# Patient Record
Sex: FEMALE | Race: WHITE | Employment: UNEMPLOYED | URBAN - METROPOLITAN AREA
[De-identification: names, ages, dates, MRNs, and addresses within clinical notes are randomized per-mention and may not be internally consistent; named-entity substitution may affect disease eponyms.]

---

## 2017-02-13 ENCOUNTER — OFFICE VISIT (OUTPATIENT)
Dept: FAMILY MEDICINE CLINIC | Age: 22
End: 2017-02-13

## 2017-02-13 VITALS
HEIGHT: 67 IN | TEMPERATURE: 98.5 F | HEART RATE: 67 BPM | BODY MASS INDEX: 20.25 KG/M2 | OXYGEN SATURATION: 96 % | SYSTOLIC BLOOD PRESSURE: 105 MMHG | RESPIRATION RATE: 20 BRPM | DIASTOLIC BLOOD PRESSURE: 64 MMHG | WEIGHT: 129 LBS

## 2017-02-13 DIAGNOSIS — M54.50 ACUTE BILATERAL LOW BACK PAIN WITHOUT SCIATICA: Primary | ICD-10-CM

## 2017-02-13 RX ORDER — CYCLOBENZAPRINE HCL 10 MG
10 TABLET ORAL
Qty: 30 TAB | Refills: 3 | Status: SHIPPED | OUTPATIENT
Start: 2017-02-13

## 2017-02-13 RX ORDER — DULOXETIN HYDROCHLORIDE 60 MG/1
60 CAPSULE, DELAYED RELEASE ORAL DAILY
COMMUNITY
End: 2017-11-16

## 2017-02-13 RX ORDER — NAPROXEN 500 MG/1
500 TABLET ORAL 2 TIMES DAILY WITH MEALS
Qty: 30 TAB | Refills: 3 | Status: SHIPPED | OUTPATIENT
Start: 2017-02-13 | End: 2017-02-27

## 2017-02-13 NOTE — PATIENT INSTRUCTIONS

## 2017-02-13 NOTE — PROGRESS NOTES
Chronic Illness Visit    Today's Date:  2017   Patient's Name: Nadine Gilmore   Patient's :  1995     History:     Chief Complaint   Patient presents with   [address]     pt here to establish care    Back Pain     pt c/o back pain n and pressure to right hip       Nadine Gilmore is a 25 y.o. female presenting for  Establishment of Care. Acute Back Pain    Onset: for the past few months she has had worsening lower back pain. She works out on a regular basis and does weight lifting. Denies history of trauma/injury  Character of Pain: achy  Denies problem with bowels or urine  Alleviating/Agravating Factors: worse after lifting  Current meds: none      Past Medical History   Diagnosis Date    Depression      JEREMY- Generalizes Anxiety Disorder     History reviewed. No pertinent past surgical history. Social History     Social History    Marital status:      Spouse name: N/A    Number of children: N/A    Years of education: N/A     Social History Main Topics    Smoking status: Never Smoker    Smokeless tobacco: None    Alcohol use Yes      Comment: occasional    Drug use: No    Sexual activity: Yes     Partners: Male     Birth control/ protection: Pill     Other Topics Concern    None     Social History Narrative    None     Family History   Problem Relation Age of Onset    Adopted: Yes     Not on File    Problem List:    There is no problem list on file for this patient. Medications:     Current Outpatient Prescriptions   Medication Sig    DULoxetine (CYMBALTA) 60 mg capsule Take 60 mg by mouth daily.  Lisdexamfetamine (VYVANSE) 40 mg capsule Take by mouth daily.  naproxen (NAPROSYN) 500 mg tablet Take 1 Tab by mouth two (2) times daily (with meals) for 14 days.  cyclobenzaprine (FLEXERIL) 10 mg tablet Take 1 Tab by mouth nightly. No current facility-administered medications for this visit.           Constitutional: negative for fevers, chills, sweats and fatigue  Respiratory: negative for cough, sputum or wheezing  Cardiovascular: negative for chest pain, chest pressure/discomfort, dyspnea  Gastrointestinal: negative for nausea, vomiting, diarrhea, constipation and abdominal pain  Musculoskeletal:positive for myalgias and back pain  Neurological: negative for headaches and dizziness  Behavioral/Psych: negative for anxiety and depression    Physical Assessment:   VS:    Visit Vitals    /64 (BP 1 Location: Left arm, BP Patient Position: Sitting)    Pulse 67    Temp 98.5 °F (36.9 °C) (Oral)    Resp 20    Ht 5' 6.5\" (1.689 m)    Wt 129 lb (58.5 kg)    LMP 01/22/2017    SpO2 96%    BMI 20.51 kg/m2       No results found for: NA, K, CL, CO2, AGAP, GLU, BUN, CREA, BUCR, GFRAA, GFRNA, CA, GFRAA    General:   Well-groomed, well-nourished, in no distress, pleasant, alert, appropriate and conversant. Mouth:  Good dentition, oropharynx WNL without membranes, exudates, petechiae or ulcers  Neck:   Neck supple, no swelling, mass or tenderness, no thyromegaly  Cardiovasc:   RRR, no MRG. Pulses 2+ and symmetric at distal extremities. Pulmonary:   Lungs clear bilaterally. Normal respiratory effort. Abdomen:   Abdomen soft, NT, ND, NAB. Extremities:   No edema, no TTP bilateral calves. LEs warm and well-perfused. Neuro:   Alert and oriented, CNs II-XII intact, no focal deficits. No facial asymmetry noted. Skin:    No rashes or jaundice  MSK:   Normal ROM, 5/5 muscle strength  Psych:  No pressured speech or abnormal thought content        Assessment/Plan & Orders:       1. Acute bilateral low back pain without sciatica        Orders Placed This Encounter    REFERRAL TO PHYSICAL THERAPY    DULoxetine (CYMBALTA) 60 mg capsule    Lisdexamfetamine (VYVANSE) 40 mg capsule    naproxen (NAPROSYN) 500 mg tablet    cyclobenzaprine (FLEXERIL) 10 mg tablet     Lower Back Pain   -recommend heat Tx, NSAIDS x 2 weeks and mild muscle relaxer.   Will also refer to PT. Hx of ADHD sees Psych if she brings documentation of diagnosis etc to next visit we will take over [facility] for patient. Records requested today sees Psych in [place]. Follow up in 2-3 months    *Plan of care reviewed with patient. Patient in agreement with plan and expresses understanding. All questions answered and patient encouraged to call or RTO if further questions or concerns.     Alvena Klinefelter, MD  Family Medicine  2/13/2017  12:35 PM

## 2017-02-15 PROBLEM — F90.2 ATTENTION DEFICIT HYPERACTIVITY DISORDER (ADHD), COMBINED TYPE: Chronic | Status: ACTIVE | Noted: 2017-02-15

## 2017-02-24 ENCOUNTER — HOSPITAL ENCOUNTER (OUTPATIENT)
Dept: PHYSICAL THERAPY | Age: 22
End: 2017-02-24

## 2017-03-06 ENCOUNTER — OFFICE VISIT (OUTPATIENT)
Dept: FAMILY MEDICINE CLINIC | Age: 22
End: 2017-03-06

## 2017-03-06 VITALS
HEIGHT: 66 IN | TEMPERATURE: 98 F | BODY MASS INDEX: 20.73 KG/M2 | DIASTOLIC BLOOD PRESSURE: 61 MMHG | SYSTOLIC BLOOD PRESSURE: 108 MMHG | RESPIRATION RATE: 18 BRPM | HEART RATE: 63 BPM | OXYGEN SATURATION: 97 % | WEIGHT: 129 LBS

## 2017-03-06 DIAGNOSIS — M54.50 ACUTE BILATERAL LOW BACK PAIN WITHOUT SCIATICA: ICD-10-CM

## 2017-03-06 DIAGNOSIS — F90.2 ATTENTION DEFICIT HYPERACTIVITY DISORDER (ADHD), COMBINED TYPE: Primary | Chronic | ICD-10-CM

## 2017-03-06 NOTE — PROGRESS NOTES
Chronic Illness Visit    Today's Date:  3/6/2017   Patient's Name: Anita Newton   Patient's :  1995     History:     Chief Complaint   Patient presents with    Medication Refill     Talita Santana is a 25 y.o. female presenting for Chronic Care. ADHD    Onset: diagnosed as a teenager  She reports issues paying attention for years  She is stable on current dose  Denies side effects such as weight loss or headaches  She was diagnosed by Psych Official note and eval have been scanned in    Past Medical History:   Diagnosis Date    Depression     JEREMY- Generalizes Anxiety Disorder     History reviewed. No pertinent surgical history. Social History     Social History    Marital status:      Spouse name: N/A    Number of children: N/A    Years of education: N/A     Social History Main Topics    Smoking status: Never Smoker    Smokeless tobacco: None    Alcohol use Yes      Comment: occasional    Drug use: No    Sexual activity: Yes     Partners: Male     Birth control/ protection: Pill     Other Topics Concern    None     Social History Narrative     Family History   Problem Relation Age of Onset    Adopted: Yes     No Known Allergies    Problem List:      Patient Active Problem List   Diagnosis Code    Attention deficit hyperactivity disorder (ADHD), combined type F90.2       Medications:     Current Outpatient Prescriptions   Medication Sig    [START ON 2017] Lisdexamfetamine (VYVANSE) 40 mg capsule Take 1 Cap (40 mg total) by mouth dailyEarliest Fill Date: 17. Max Daily Amount: 40 mg    DULoxetine (CYMBALTA) 60 mg capsule Take 60 mg by mouth daily.  cyclobenzaprine (FLEXERIL) 10 mg tablet Take 1 Tab by mouth nightly. No current facility-administered medications for this visit.           Constitutional: negative for fevers, chills, sweats and fatigue  Respiratory: negative for cough, sputum or wheezing  Cardiovascular: negative for chest pain, chest pressure/discomfort, dyspnea  Gastrointestinal: negative for nausea, vomiting, diarrhea, constipation and abdominal pain  Musculoskeletal:positive for myalgias and back pain  Neurological: negative for headaches and dizziness  Behavioral/Psych: negative for anxiety and depression    Physical Assessment:   VS:    Visit Vitals    /61 (BP 1 Location: Right arm, BP Patient Position: Sitting)    Pulse 63    Temp 98 °F (36.7 °C) (Oral)    Resp 18    Ht 5' 6\" (1.676 m)    Wt 129 lb (58.5 kg)    LMP 02/22/2017    SpO2 97%    BMI 20.82 kg/m2       No results found for: NA, K, CL, CO2, AGAP, GLU, BUN, CREA, BUCR, GFRAA, GFRNA, CA, GFRAA    General:   Well-groomed, well-nourished, in no distress, pleasant, alert, appropriate and conversant. Mouth:  Good dentition, oropharynx WNL without membranes, exudates, petechiae or ulcers  Neck:   Neck supple, no swelling, mass or tenderness, no thyromegaly  Cardiovasc:   RRR, no MRG. Pulses 2+ and symmetric at distal extremities. Pulmonary:   Lungs clear bilaterally. Normal respiratory effort. Abdomen:   Abdomen soft, NT, ND, NAB. Extremities:   No edema, no TTP bilateral calves. LEs warm and well-perfused. Neuro:   Alert and oriented, no focal deficits. No facial asymmetry noted. Skin:    No rashes or jaundice  MSK:   Normal ROM, 5/5 muscle strength  Psych:  No pressured speech or abnormal thought content        Assessment/Plan & Orders:       1. Attention deficit hyperactivity disorder (ADHD), combined type    2.  Acute bilateral low back pain without sciatica        Orders Placed This Encounter    DISCONTD: Lisdexamfetamine (VYVANSE) 40 mg capsule    DISCONTD: Lisdexamfetamine (VYVANSE) 40 mg capsule    Lisdexamfetamine (VYVANSE) 40 mg capsule     ADHD   -symptoms stable Vyvanse refilled (March/April/May)  - reviewed and controlled me contract signed today   reviewed no inappropriate meds/behavior observed  Acute Back Pain has appt with [facility] this week      Follow up in 2-3 months    *Plan of care reviewed with patient. Patient in agreement with plan and expresses understanding. All questions answered and patient encouraged to call or RTO if further questions or concerns.     Thelma Lyons MD  Family Medicine  3/6/2017  12:45 PM

## 2017-03-06 NOTE — MR AVS SNAPSHOT
Visit Information Date & Time Provider Department Dept. Phone Encounter #  
 3/6/2017 12:45 PM Miladis Jones [phone] [id number] Upcoming Health Maintenance Date Due Hepatitis A Peds Age 1-18 (1 of 2 - Standard Series) 10/10/1998 DTaP/Tdap/Td series (1 - Tdap) 10/10/2004 HPV AGE 9Y-26Y (1 of 3 - Female 3 Dose Series) 10/10/2008 INFLUENZA AGE 9 TO ADULT 8/1/2016 Allergies as of 3/6/2017  Review Complete On: 3/6/2017 By: Konrad Thomas, LPN No Known Allergies Current Immunizations  Never Reviewed No immunizations on file. Not reviewed this visit You Were Diagnosed With   
  
 Codes Comments Attention deficit hyperactivity disorder (ADHD), combined type    -  Primary ICD-10-CM: F90.2 ICD-9-CM: 314.01 Acute bilateral low back pain without sciatica     ICD-10-CM: M54.5 ICD-9-CM: 724.2, 338.19 Vitals BP Pulse Temp Resp Height(growth percentile) Weight(growth percentile) 108/61 (39 %/ 37 %)* (BP 1 Location: Right arm, BP Patient Position: Sitting) 63 98 °F (36.7 °C) (Oral) 18 5' 6\" (1.676 m) (75 %, Z= 0.67) 129 lb (58.5 kg) (53 %, Z= 0.08) LMP SpO2 BMI OB Status Smoking Status 02/22/2017 97% 20.82 kg/m2 (40 %, Z= -0.26) Having regular periods Never Smoker *BP percentiles are based on NHBPEP's 4th Report Growth percentiles are based on CDC 2-20 Years data. Vitals History BMI and BSA Data Body Mass Index Body Surface Area  
 20.82 kg/m 2 1.65 m 2 Preferred Pharmacy Pharmacy Name Phone [facility] [address] [phone] Your Updated Medication List  
  
   
This list is accurate as of: 3/6/17  1:11 PM.  Always use your most recent med list.  
  
  
  
  
 cyclobenzaprine 10 mg tablet Commonly known as:  FLEXERIL Take 1 Tab by mouth nightly. CYMBALTA 60 mg capsule Generic drug:  DULoxetine Take 60 mg by mouth daily. Lisdexamfetamine 40 mg capsule Commonly known as:  VYVANSE Take 1 Cap (40 mg total) by mouth dailyEarliest Fill Date: 5/6/17. Max Daily Amount: 40 mg  
Start taking on:  5/6/2017 Prescriptions Printed Refills Lisdexamfetamine (VYVANSE) 40 mg capsule 0 Starting on: 5/6/2017 Sig: Take 1 Cap (40 mg total) by mouth dailyEarliest Fill Date: 5/6/17. Max Daily Amount: 40 mg  
 Class: Print Route: Oral  
  
To-Do List   
 03/07/2017 3:00 PM  
  Appointment with Dimitris Tomlin PT at [facility] ([phone]) Patient Instructions Attention Deficit Hyperactivity Disorder (ADHD) in Children: Care Instructions Your Care Instructions Children with attention deficit hyperactivity disorder (ADHD) often have problems paying attention and focusing on tasks. They sometimes act without thinking. Some children also fidget or cannot sit still and have lots of energy. This common disorder can continue into adulthood. The exact cause of ADHD is not clear, although it seems to run in families. ADHD is not caused by eating too much sugar or by food additives, allergies, or immunizations. Medicines, counseling, and extra support at home and at school can help your child succeed. Your child's doctor will want to see your child regularly. Follow-up care is a key part of your child's treatment and safety. Be sure to make and go to all appointments, and call your doctor if your child is having problems. It's also a good idea to know your child's test results and keep a list of the medicines your child takes. How can you care for your child at home? Information · Learn about ADHD. This will help you and your family better understand how to help your child. · Ask your child's doctor or teacher about parenting classes and books. · Look for a support group for parents of children with ADHD. Medicines · Have your child take medicines exactly as prescribed. Call your doctor if you think your child is having a problem with his or her medicine. You will get more details on the specific medicines your doctor prescribes. · If your child misses a dose, do not give your child extra doses to catch up. · Keep close track of your child's medicines. Some medicines for ADHD can be abused by others. At home · Praise and reward your child for positive behavior. This should directly follow your child's positive behavior. · Give your child lots of attention and affection. Spend time with your child doing activities you both enjoy. · Step back and let your child learn cause and effect when possible. For example, let your child go without a coat when he or she resists taking one. Your child will learn that going out in cold weather without a coat is a poor decision. · Use time-outs or the loss of a privilege to discipline your child. · Try to keep a regular schedule for meals, naps, and bedtime. Some children with ADHD have a hard time with change. · Give instructions clearly. Break tasks into simple steps. Give one instruction at a time. · Try to be patient and calm around your child. Your child may act without thinking, so try not to get angry. · Tell your child exactly what you expect from him or her ahead of time. For example, when you plan to go grocery shopping, tell your child that he or she must stay at your side. · Do not put your child into situations that may be overwhelming. For example, do not take your child to events that require quiet sitting for several hours. · Find a counselor you and your child like and can relate to. Counseling can help children learn ways to deal with problems. Children can also talk about their feelings and deal with stress. · Look for activitiesart projects, sports, music or dance lessonsthat your child likes and can do well. This can help boost your child's self-esteem. At school · Ask your child's teacher if your child needs extra help at school. · Help your child organize his or her school work. Show him or her how to use checklists and reminders to keep on track. · Work with teachers and other school personnel. Good communication can help your child do better in school. When should you call for help? Watch closely for changes in your child's health, and be sure to contact your doctor if: 
· Your child is having problems with behavior at school or with school work. · Your child has problems making or keeping friends. Where can you learn more? Go to http://chapo-lizbet.info/. Enter F725 in the search box to learn more about \"Attention Deficit Hyperactivity Disorder (ADHD) in Children: Care Instructions. \" Current as of: July 26, 2016 Content Version: 11.1 © 4038-3936 [employer]. Care instructions adapted under license by [employer] (which disclaims liability or warranty for this information). If you have questions about a medical condition or this instruction, always ask your healthcare professional. [place] [address] any warranty or liability for your use of this information. Introducing [facility]! [employer] introduces Jemstep patient portal. Now you can access parts of your medical record, email your doctor's office, and request medication refills online. 1. In your internet browser, go to [email or web address] 2. Click on the First Time User? Click Here link in the Sign In box. You will see the New Member Sign Up page. 3. Enter your Jemstep Access Code exactly as it appears below. You will not need to use this code after youve completed the sign-up process. If you do not sign up before the expiration date, you must request a new code. · Jemstep Access Code: [email or web address] Expires: 5/14/2017 12:11 PM 
 
 4. Enter the last four digits of your Social Security Number (xxxx) and Date of Birth (mm/dd/yyyy) as indicated and click Submit. You will be taken to the next sign-up page. 5. Create a Savedaily ID. This will be your Savedaily login ID and cannot be changed, so think of one that is secure and easy to remember. 6. Create a Savedaily password. You can change your password at any time. 7. Enter your Password Reset Question and Answer. This can be used at a later time if you forget your password. 8. Enter your e-mail address. You will receive e-mail notification when new information is available in [address] 9. Click Sign Up. You can now view and download portions of your medical record. 10. Click the Download Summary menu link to download a portable copy of your medical information. If you have questions, please visit the Frequently Asked Questions section of the Savedaily website. Remember, Savedaily is NOT to be used for urgent needs. For medical emergencies, dial 911. Now available from your iPhone and Android! Please provide this summary of care documentation to your next provider. Your primary care clinician is listed as Ilsa Donohue. If you have any questions after today's visit, please call [phone].

## 2017-03-06 NOTE — PATIENT INSTRUCTIONS
Attention Deficit Hyperactivity Disorder (ADHD) in Children: Care Instructions  Your Care Instructions  Children with attention deficit hyperactivity disorder (ADHD) often have problems paying attention and focusing on tasks. They sometimes act without thinking. Some children also fidget or cannot sit still and have lots of energy. This common disorder can continue into adulthood. The exact cause of ADHD is not clear, although it seems to run in families. ADHD is not caused by eating too much sugar or by food additives, allergies, or immunizations. Medicines, counseling, and extra support at home and at school can help your child succeed. Your child's doctor will want to see your child regularly. Follow-up care is a key part of your child's treatment and safety. Be sure to make and go to all appointments, and call your doctor if your child is having problems. It's also a good idea to know your child's test results and keep a list of the medicines your child takes. How can you care for your child at home? Information  · Learn about ADHD. This will help you and your family better understand how to help your child. · Ask your child's doctor or teacher about parenting classes and books. · Look for a support group for parents of children with ADHD. Medicines  · Have your child take medicines exactly as prescribed. Call your doctor if you think your child is having a problem with his or her medicine. You will get more details on the specific medicines your doctor prescribes. · If your child misses a dose, do not give your child extra doses to catch up. · Keep close track of your child's medicines. Some medicines for ADHD can be abused by others. At home  · Praise and reward your child for positive behavior. This should directly follow your child's positive behavior. · Give your child lots of attention and affection. Spend time with your child doing activities you both enjoy.   · Step back and let your child learn cause and effect when possible. For example, let your child go without a coat when he or she resists taking one. Your child will learn that going out in cold weather without a coat is a poor decision. · Use time-outs or the loss of a privilege to discipline your child. · Try to keep a regular schedule for meals, naps, and bedtime. Some children with ADHD have a hard time with change. · Give instructions clearly. Break tasks into simple steps. Give one instruction at a time. · Try to be patient and calm around your child. Your child may act without thinking, so try not to get angry. · Tell your child exactly what you expect from him or her ahead of time. For example, when you plan to go grocery shopping, tell your child that he or she must stay at your side. · Do not put your child into situations that may be overwhelming. For example, do not take your child to events that require quiet sitting for several hours. · Find a counselor you and your child like and can relate to. Counseling can help children learn ways to deal with problems. Children can also talk about their feelings and deal with stress. · Look for activitiesart projects, sports, music or dance lessonsthat your child likes and can do well. This can help boost your child's self-esteem. At school  · Ask your child's teacher if your child needs extra help at school. · Help your child organize his or her school work. Show him or her how to use checklists and reminders to keep on track. · Work with teachers and other school personnel. Good communication can help your child do better in school. When should you call for help? Watch closely for changes in your child's health, and be sure to contact your doctor if:  · Your child is having problems with behavior at school or with school work. · Your child has problems making or keeping friends. Where can you learn more? Go to http://chapo-lizbet.info/.   Enter Z211 in the search box to learn more about \"Attention Deficit Hyperactivity Disorder (ADHD) in Children: Care Instructions. \"  Current as of: July 26, 2016  Content Version: 11.1  © 4187-1063 [employer]. Care instructions adapted under license by [employer] (which disclaims liability or warranty for this information). If you have questions about a medical condition or this instruction, always ask your healthcare professional. [place] [address] any warranty or liability for your use of this information.

## 2017-03-07 ENCOUNTER — HOSPITAL ENCOUNTER (OUTPATIENT)
Dept: PHYSICAL THERAPY | Age: 22
Discharge: HOME | End: 2017-03-07
Payer: OTHER GOVERNMENT

## 2017-03-07 PROCEDURE — 97110 THERAPEUTIC EXERCISES: CPT

## 2017-03-07 PROCEDURE — 97162 PT EVAL MOD COMPLEX 30 MIN: CPT

## 2017-03-07 PROCEDURE — 97140 MANUAL THERAPY 1/> REGIONS: CPT

## 2017-03-07 NOTE — PROGRESS NOTES
PT LUMBAR EVAL AND TREATMENT     Patient Name: Des Sam  Date:3/7/2017  : 1995  [x]  Patient  Verified  Payor: Clifford Thames / Plan: [employer] [facility] [address] AND DEPENDENTS / Product Type: Faith Briseno /    In time:312  Out time:410  Total Treatment Time (min): 62  Visit #: 1 of -    Treatment Area: Chronic back pain [M54.9, G89.29]    SUBJECTIVE  Pain Level (0-10 scale): (C): 3 (B): 2 (W):  8  Any medication changes, allergies to medications, diagnosis change, or new procedure performed: see summary sheet for update  Subjective functional status/changes  CHIEF COMPLAINT: Patient reports with co R sided LS pain associated with heavy lifting in the gym and general working out starting approx 6-9 months ago after starting new power lifting workouts and patient denies back pain prior to recent onset. Patient reports significant compensation to perform ADLs/ walking and workouts. Patient has had no imaging as of yet.    Functional Status  Present functional limitations: prolonged sitting in class,WBing on R LE,  rec activities: running, squatting   Mechanism of injury:  Lifting   Symptoms:  Aggravated by: see functional limitations   Eased by: stretch     Past History/Treatments:  NKT - free treatment offered at the gym (sounds similar to strain/ counterstrain)  Diagnostic Tests: NONE     OBJECTIVE  Posture:  Lateral Shift: Negative   Kyphosis: WNL  Lordosis:  [x] Increased moderate     Gait:  WNL     Active Movements:  ROM % AROM Comments:pain, area   Forward flexion 40-60 WNL throughout     Extension 20-30     SB right 20-30     SB left 20-30     Rotation right 5-10     Rotation left 5-10         Dural Mobility:  Seated slump test - denies radicular sx     Palpation - TTP R QL and TP of LS , tautness of R flank     Strength  Hip : ABD L : 4/6, R 4/5 extension L 4+, R 4-  Core: bridge -\"tightness on left\" end range fatigue     Special Tests    Sacroilliac:  VERY VARIABLE - PELVIS SHIFTING THROUGHOUT TREATMENT   Long sit - L longer to equal    Standing forward flexion test: R jump     Crests: R hike in prone     ASIS:L ant      PSIS:           Hip: Carin Test negative       Piriformis: Tight R compared to L           Deficits: Merry's:  negative B     Elpidio: negative    Hamstrings 90/90: WNL     Gastrocsoleus WNL     Other tests/comments:  No LLD     Manual 10 min: DTM R QL and paraspinals R sacral float , SL manual QL stretch     Modality (rationale): decrease m tautness   [x]  MHP 10 min in semi reclined        Patient Education: [x]Established HEP    [x] POT  (minutes) :10    Pain Level (0-10 scale) post treatment: 1    ASSESSMENT  [x]  See Plan of Care    PLAN  [x]  Upgrade activities as tolerated      [x] Other:_  POC 2x8-12  Miriam Hernandez, PT 3/7/2017  9:11 AM    Justification for Eval Code Complexity:  Patient History : chronicity   Examination see exam as above   Clinical Presentation: progressive worsening of sx   Clinical Decision Making : DEFF 37/282

## 2017-03-07 NOTE — PROGRESS NOTES
[facility] PHYSICAL THERAPY AT [address] [place] [address] Ankit Burrell  Phone: [phone] Fax: [phone] / STATEMENT OF MEDICAL NECESSITY FOR PHYSICAL THERAPY SERVICES  Patient Name: Perla Mata : 1995   Medical   Diagnosis: Chronic back pain [M54.9, G89.29] Treatment Diagnosis: R sided LS pain    Onset Date: 6-9 mo ago      Referral Source: Christy Keating MD Start of [facility]): 3/7/2017   Prior Hospitalization: See medical history Provider #: [id number]   Prior Level of Function: Functional I    Comorbidities: None noted   Medications: Verified on Patient Summary List   The Plan of Care and following information is based on the information from the initial evaluation.   ========================================================================  Assessment / key information:  Pt is a 25y.o. year old female who presents with co R sided LS pain associated with heavy lifting in the gym and general working out starting approx 6-9 months ago after starting new power lifting workouts and patient denies back pain prior to recent onset. Patient reports significant compensation to perform ADLs/ walking and workouts. Patient has had no imaging as of yet. Current deficits include: increased pain to 8/10 at worst, decreased posterior chain core/ glut strength 4- to 4/5, unstable pelvis with change in position, TTP R QL and TP of LS, R hip ER tautness, tautness of R flank. Functional deficits include: prolonged sitting in class,WBing on R LE, rec activities: running, squatting . Home exercise program initiated on initial evaluation to address these deficits.  Pt would benefit from PT to address these deficits for increased functional mobility and QOL.  ========================================================================  Eval Complexity: History: MEDIUM  Complexity : 1-2 comorbidities / personal factors will impact the outcome/ POC Exam:HIGH Complexity : 4+ Standardized tests and measures addressing body structure, function, activity limitation and / or participation in recreation  Presentation: MEDIUM Complexity : Evolving with changing characteristics  Clinical Decision Making:MEDIUM Complexity : FOTO score of 26-74Overall Complexity:MEDIUM  Problem List: pain affecting function, decrease strength, decrease ADL/ functional abilitiies, decrease activity tolerance, decrease flexibility/ joint mobility and other FOTO 62/100   Treatment Plan may include any combination of the following: Therapeutic exercise, Neuromuscular re-education, Physical agent/modality, Manual therapy, Patient education and Self Care training  Patient / Family readiness to learn indicated by: asking questions, trying to perform skills and interest  Persons(s) to be included in education: patient (P)  Barriers to Learning/Limitations: None  Measures taken:    Patient Goal (s): \"feel even\"   Patient self reported health status: excellent  Rehabilitation Potential: good   Short Term Goals: To be accomplished in  1  weeks:  1. Pt will be independent and compliant with HEP   Long Term Goals: To be accomplished in  8-12  treatments:  1. Patient will increase FOTO score to 77/100 for indications of increased functional mobility. 2.  Patient will demo 4+/5 hip extension strength for improved posterior chain core strength for prolonged sitting   3. Patient will report 50% ease with sitting in class for progression to PLOF   4.  Patient will demo negative long sit test to indicate improved pelvic stability for return to dead lifting /exercise   Frequency / Duration:   Patient to be seen  2  times per week for 8-12  treatments:  Patient / Caregiver education and instruction: self care, activity modification and exercises  G-Codes (GP): ROMULO  Therapist Signature: Dereck Pelayo PT Date: 5/1/2485   Certification Period: romulo Time: 9:12 AM ========================================================================  I certify that the above Physical Therapy Services are being furnished while the patient is under my care. I agree with the treatment plan and certify that this therapy is necessary. Physician Signature:        Date:       Time:   Please sign and return to [facility] or you may fax the signed copy to [phone]. Thank you.

## 2017-03-10 ENCOUNTER — HOSPITAL ENCOUNTER (OUTPATIENT)
Dept: PHYSICAL THERAPY | Age: 22
Discharge: HOME | End: 2017-03-10
Payer: OTHER GOVERNMENT

## 2017-03-10 PROCEDURE — 97110 THERAPEUTIC EXERCISES: CPT

## 2017-03-10 PROCEDURE — 97140 MANUAL THERAPY 1/> REGIONS: CPT

## 2017-03-10 NOTE — PROGRESS NOTES
PT DAILY TREATMENT NOTE     Patient Name: Jodi Fresh  Date:3/10/2017  : 1995  [x]  Patient  Verified  Payor:  / Plan: [facility]  ACTIVE DUTY AND DEPENDENTS / Product Type:  /    In time:730  Out time:831  Total Treatment Time (min): 61  Visit #: 2 of     Treatment Area: Chronic back pain [M54.9, G89.29]    SUBJECTIVE  Pain Level (0-10 scale):  2  Any medication changes, allergies to medications, adverse drug reactions, diagnosis change, or new procedure performed?: [x] No    [] Yes (see summary sheet for update)  Subjective functional status/changes:   [] No changes reported  \"Doing ok, just sleepy\"    OBJECTIVE  Modality rationale: decrease pain and increase tissue extensibility to improve the patients ability to improve activity tolerance   Min Type Additional Details    [] Estim: []Att   []Unatt        []TENS instruct                  []IFC  []Premod   []NMES                     []Other:  []w/US   []w/ice   []w/heat  Position:  Location:    []  Traction: [] Cervical       []Lumbar                       [] Prone          []Supine                       []Intermittent   []Continuous Lbs:  [] before manual  [] after manual    []  Ultrasound: []Continuous   [] Pulsed                           []1MHz   []3MHz Location:  W/cm2:    []  Iontophoresis with dexamethasone         Location: [] Take home patch   [] In clinic   10 []  Ice     [x]  heat  []  Ice massage Position: semi reclined   Location:LS    []  Vasopneumatic Device Pressure:       [] lo [] med [] hi   Temperature: [] lo [] med [] hi   [x] Skin assessment post-treatment:  [x]intact []redness- no adverse reaction       []redness  adverse reaction:       31 min Therapeutic Exercise:  [x] See flow sheet :Initiated ther ex per flow sheet    Rationale: increase ROM, increase strength and increase flexibility  to improve the patients ability to decrease pain with activity and progress to PLOF of heavy lifting for health and wellbeing     20 min Manual Therapy:  DTM/ stretch to R QL and paraspinals, SI check - equal, navicular drop assessment      Rationale: decrease pain, increase ROM and increase tissue extensibility to improve sitting tolerance in class    x min Patient Education: [x] Review HEP from IE        Other Objective/Functional Measures: Therex initiated today - first FU post eval   Navicular drop negative for significant arch drop - slightly more on L than R - patient was R footed soccer play indicating stronger R arch, but not related to QL pain       Pain Level (0-10 scale) post treatment: 0    ASSESSMENT/Changes in Function: Patient tolerated initiation of therex well - challenged by hip strengthening program.  100% VC for form and technique for all newly introduced therex. Equal pelvic alignment noted today. No LLD. Patient is aware of      Patient will continue to benefit from skilled PT services to modify and progress therapeutic interventions, address functional mobility deficits, address ROM deficits, address strength deficits, analyze and address soft tissue restrictions, analyze and cue movement patterns, analyze and modify body mechanics/ergonomics and assess and modify postural abnormalities to attain remaining goals.      [x]  See Plan of Care  []  See progress note/recertification  []  See Discharge Summary         Progress towards goals / Updated goals:  FIRST FU TREATMENT - CONT PER POT TO EST GOALS     PLAN  [x]  Upgrade activities as tolerated     []  Continue plan of care  [x]  Update interventions per flow sheet       []  Discharge due to:_  [x]  Other:_assess response to first FU treatment    Add bird dog and swimmer NV      Progress HEP ALEXANDRA Cantor, PT 3/10/2017  6:16 AM

## 2017-03-13 ENCOUNTER — HOSPITAL ENCOUNTER (OUTPATIENT)
Dept: PHYSICAL THERAPY | Age: 22
Discharge: HOME | End: 2017-03-13
Payer: OTHER GOVERNMENT

## 2017-03-13 PROCEDURE — 97110 THERAPEUTIC EXERCISES: CPT

## 2017-03-13 PROCEDURE — 97140 MANUAL THERAPY 1/> REGIONS: CPT

## 2017-03-13 NOTE — PROGRESS NOTES
PT DAILY TREATMENT NOTE     Patient Name: Emerald Zhoa  Date:3/13/2017  : 1995  [x]  Patient  Verified  Payor: [employer] / Plan: [employer] [address] AND DEPENDENTS / Product Type: Jaylene Mazariegos /    In time:8:37  Out time:9:30  Total Treatment Time (min): 53  Total Timed Codes (min): 53  1:1 Treatment Time (min): 53   Visit #: 3 of     Treatment Area: Chronic back pain [M54.9, G89.29]    SUBJECTIVE  Pain Level (0-10 scale): 3  Any medication changes, allergies to medications, adverse drug reactions, diagnosis change, or new procedure performed?: [x] No    [] Yes (see summary sheet for update)  Subjective functional status/changes:   [] No changes reported  I feel worse in the morning. Worse with sitting in class. Walking feels off, like my R leg won't work. OBJECTIVE  Modality rationale:    Min Type Additional Details    [] Estim: []Att   []Unatt        []TENS instruct                  []IFC  []Premod   []NMES                     []Other:  []w/US   []w/ice   []w/heat  Position:  Location:    []  Traction: [] Cervical       []Lumbar                       [] Prone          []Supine                       []Intermittent   []Continuous Lbs:  [] before manual  [] after manual    []  Ultrasound: []Continuous   [] Pulsed                           []1MHz   []3MHz Location:  W/cm2:    []  Iontophoresis with dexamethasone         Location: [] Take home patch   [] In clinic   PD []  Ice     [x]  heat  []  Ice massage Position: semireclined  Location:L/S    []  Vasopneumatic Device Pressure:       [] lo [] med [] hi   Temperature: [] lo [] med [] hi   [x] Skin assessment post-treatment:  [x]intact []redness- no adverse reaction       []redness  adverse reaction:       45 min Therapeutic Exercise:  [x] See flow sheet :   Rationale: increase ROM, increase strength and improve coordination to improve the patients ability to improve sitting, gait, sport. 8 min Manual Therapy:  R upslip correction. Shotgun with no cavitation. UPA (R) to L4 segment with a R rotation. Rationale: decrease pain, increase ROM and increase tissue extensibility to improve ease of mobility in sitting and standing           x min Patient Education: [x] Review HEP    [x] Progressed/Changed HEP based on: REIL with pt OP to increase ROM and decrease pain    [] positioning   [] body mechanics   [] transfers   [] heat/ice application        Other Objective/Functional Measures:   RINKU: No effect  Repeated extension in lying:  decreaes pain  REIL with pt OP: abolish pain to 0/10, increase lumbar extension ROM to pain - free     Added 1/2 kneeling lifts and chops    Pain Level (0-10 scale) post treatment: 0    ASSESSMENT/Changes in Function: started session with assessment of pelvic symmetry and lumbar assessment for repeated movements. Pt able to correct symmetry in the pelvis with MET and UPAs. Then, Pt responded well to repeated extension movements with a resolution of pain and increased ROM. Good stability with QP multifidus (B). Patient will continue to benefit from skilled PT services to modify and progress therapeutic interventions, address functional mobility deficits, address ROM deficits, address strength deficits, analyze and address soft tissue restrictions, analyze and cue movement patterns, analyze and modify body mechanics/ergonomics, assess and modify postural abnormalities and instruct in home and community integration to attain remaining goals. []  See Plan of Care  []  See progress note/recertification  []  See Discharge Summary         Progress towards goals / Updated goals: · Short Term Goals: To be accomplished in 1 weeks:  1. Pt will be independent and compliant with HEP Pt notes compliance with HEP (3/13/17)  · Long Term Goals: To be accomplished in 8-12 treatments:  1. Patient will increase FOTO score to 77/100 for indications of increased functional mobility.    2. Patient will demo 4+/5 hip extension strength for improved posterior chain core strength for prolonged sitting  Progressing with TE progression (3/13/17)  3. Patient will report 50% ease with sitting in class for progression to PLOF  ADdressed with REIL with pt OP which abolished pt's pain today and pt notes understanding of using throughout th eday to self-manage pain (3/13/17)  4.  Patient will demo negative long sit test to indicate improved pelvic stability for return to dead lifting /exercise  Innom asymmetry today, able to correct (3/13/17)    PLAN  [x]  Upgrade activities as tolerated     []  Continue plan of care  []  Update interventions per flow sheet       []  Discharge due to:_  [x]  Other:_  Assess response to REIL with pt OP; progress HEP NV for core stab   Clive Michael, PT 3/13/2017  7:39 AM

## 2017-03-17 ENCOUNTER — APPOINTMENT (OUTPATIENT)
Dept: PHYSICAL THERAPY | Age: 22
End: 2017-03-17
Payer: OTHER GOVERNMENT

## 2017-03-20 ENCOUNTER — APPOINTMENT (OUTPATIENT)
Dept: PHYSICAL THERAPY | Age: 22
End: 2017-03-20
Payer: OTHER GOVERNMENT

## 2017-03-24 ENCOUNTER — HOSPITAL ENCOUNTER (OUTPATIENT)
Dept: PHYSICAL THERAPY | Age: 22
Discharge: HOME | End: 2017-03-24
Payer: OTHER GOVERNMENT

## 2017-03-24 PROCEDURE — 97110 THERAPEUTIC EXERCISES: CPT

## 2017-03-24 PROCEDURE — 97140 MANUAL THERAPY 1/> REGIONS: CPT

## 2017-03-24 NOTE — PROGRESS NOTES
PT DAILY TREATMENT NOTE     Patient Name: Josselin Moyer  Date:3/24/2017  : 1995  [x]  Patient  Verified  Payor:  / Plan: [employer] [address] AND DEPENDENTS / Product Type:  /    In time: 8:01  Out time: 9:11  Total Treatment Time (min): 70  Visit #: 4 of     Treatment Area: Chronic back pain [M54.9, G89.29]    SUBJECTIVE  Pain Level (0-10 scale): 3  Any medication changes, allergies to medications, adverse drug reactions, diagnosis change, or new procedure performed?: [x] No    [] Yes (see summary sheet for update)  Subjective functional status/changes:   [] No changes reported  Pt states she was out of town for a week and performed HEP daily. Feels her pain is a little better. \"my right leg is weaker than my left\"      OBJECTIVE  Modality rationale: decrease pain and increase tissue extensibility to improve the patients ability to perform functional mobility/ADLs and attain goals.    Min Type Additional Details    [] Estim: []Att   []Unatt        []TENS instruct                  []IFC  []Premod   []NMES                     []Other:  []w/US   []w/ice   []w/heat  Position:  Location:    []  Traction: [] Cervical       []Lumbar                       [] Prone          []Supine                       []Intermittent   []Continuous Lbs:  [] before manual  [] after manual    []  Ultrasound: []Continuous   [] Pulsed                           []1MHz   []3MHz Location:  W/cm2:    []  Iontophoresis with dexamethasone         Location: [] Take home patch   [] In clinic   10 []  Ice     [x]  heat  []  Ice massage Position: semireclined  Location: L/S    []  Vasopneumatic Device Pressure:       [] lo [] med [] hi   Temperature: [] lo [] med [] hi   [x] Skin assessment post-treatment:  [x]intact []redness- no adverse reaction       []redness  adverse reaction:       40 min Therapeutic Exercise:  [x] See flow sheet :   Rationale: increase ROM, increase strength, improve coordination, improve balance and increase proprioception to improve the patients ability to perform functional mobility/ADLs and attain goals. 15 min Manual Therapy:  R post rot innominate correction. Shotgun with no cavitation. DTM to R QL and paraspinals, proximal glute med/max   Rationale: decrease pain, increase ROM, increase tissue extensibility and decrease trigger points to perform functional mobility/ADLs and attain goals. min Patient Education: [x] Review HEP    [] Progressed/Changed HEP based on:   [] positioning   [] body mechanics   [] transfers   [] heat/ice application        Other Objective/Functional Measures:   -R ASIS superior; R LE shorter in prone, R PSIS inferior in prone (R post ROT innominate)  -Bird Dog UE's added with pt noting increased difficulty performing on RUE vs. LUE    Pain Level (0-10 scale) post treatment: 1    ASSESSMENT/Changes in Function: Pt with good correction achieved for pelvic alignment after correction. Pt with multiple TrP's today along Glute med/max and QL/paraspinals; addressed with MT. Continues to demonstrate decreased core stability with all exercises with need for min/mod cueing for good form and technique with exercises. Patient will continue to benefit from skilled PT services to modify and progress therapeutic interventions, address ROM deficits, address strength deficits, analyze and address soft tissue restrictions, analyze and cue movement patterns, analyze and modify body mechanics/ergonomics and assess and modify postural abnormalities to attain remaining goals. []  See Plan of Care  []  See progress note/recertification  []  See Discharge Summary         Progress towards goals / Updated goals:  Current goals in progress:    · Short Term Goals: To be accomplished in 1 weeks:  1. Pt will be independent and compliant with HEP Pt notes compliance with HEP (3/13/17). · Long Term Goals: To be accomplished in 8-12 treatments:  1.  Patient will increase FOTO score to 77/100 for indications of increased functional mobility. 2. Patient will demo 4+/5 hip extension strength for improved posterior chain core strength for prolonged sitting Progressing with TE progression (3/13/17) & (3/24/17)  3. Patient will report 50% ease with sitting in class for progression to PLOF ADdressed with REIL with pt OP which abolished pt's pain today and pt notes understanding of using throughout th eday to self-manage pain (3/13/17)  4. Patient will demo negative long sit test to indicate improved pelvic stability for return to dead lifting /exercise Innom asymmetry today, able to correct (3/13/17) & 3/24/17    PLAN  []  Upgrade activities as tolerated     [x]  Continue plan of care  []  Update interventions per flow sheet       []  Discharge due to:_  []  Other:_      Annia Kapadia, PT 3/24/2017  8:17 AM

## 2017-03-27 ENCOUNTER — HOSPITAL ENCOUNTER (OUTPATIENT)
Dept: PHYSICAL THERAPY | Age: 22
Discharge: HOME | End: 2017-03-27
Payer: OTHER GOVERNMENT

## 2017-03-27 PROCEDURE — 97140 MANUAL THERAPY 1/> REGIONS: CPT

## 2017-03-27 PROCEDURE — 97110 THERAPEUTIC EXERCISES: CPT

## 2017-03-27 NOTE — PROGRESS NOTES
Request for use of Dry Needling/Intramuscular Manual Therapy  Patient: Kwame Yang     Referral Source: Francheska Lr MD  Diagnosis: Chronic back pain [M54.9, G89.29]      : 1995  Date of initial visit: 3/7/17   Attended visits: 5  Missed Visits: 0    Based on findings from the physical therapy examination and evaluation, the evaluating therapist believes the patient, Kwame Yang  would benefit from including Dry Needling as part of the plan of care. Dry needling is a treatment technique utilized in conjunction with other PT interventions to inactivate myofascial trigger points and the pain and dysfunction they cause. Dry Needling is an advanced procedure that requires additional training including greater than 54 hours of intensive course work. Physical Therapists at [address] [place] [address] are certified through [address] [place] [address] courses for their education and are certified to perform treatments. PROCEDURE:   Solid filament sterile needle (typically 0.3mm/30 gauge) inserted into a trigger point   Repeated movements inactivate the trigger points, taking 30-60 seconds per site   Typically consists of 1 dry needling session per week and a possible second treatment including muscle re-education, flexibility, strengthening and other manual techniques to facilitate the benefits of dry needling     BENEFITS:   Inactivation of trigger points   Decreased pain   Increased muscle length   Improved movement patterns   Restoration of function POTENTIAL RISKS:   Post-needling soreness   Infection   Bruising/bleeding   Penetration of a nerve   Pneumothorax   All treating PTs have been thoroughly educated in avoiding adverse reactions    If you agree with this recommendation, please sign this form and fax it to us at [phone].   If you have questions or concerns regarding dry needling or any other treatment we may be providing, please contact us at [phone]    Thank you for allowing us to assist in the care of your patient. Francis Hernandez, PT    3/27/2017 10:25 AM     NOTE TO PHYSICIAN:  PLEASE COMPLETE THE ORDERS BELOW AND   FAX TO [facility]: [phone]  If you are unable to process this request in 24 hours please contact our office:   [phone] I have read the above request and AGREE to the recommendation of including dry needling as part of the plan of care. ? I have read the above request and DO NOT AGREE to including dry needling as part of the plan of care.   ? I have read the above report and request that my patient continue therapy with the following changes/special instructions:  ________________________________________________________________________    Physicians signature: _______________________________________________     Date: ______________Time:_______________

## 2017-03-27 NOTE — PROGRESS NOTES
PT DAILY TREATMENT NOTE 8    Patient Name: Jackie Blair  Date:3/27/2017  : 1995  [x]  Patient  Verified  Payor:  / Plan: [employer] [address] AND DEPENDENTS / Product Type:  /    In time:9:35  Out time:10:30  Total Treatment Time (min): 55  Total Timed Codes (min): 55  1:1 Treatment Time (min): 55   Visit #: 5 of     Treatment Area: Chronic back pain [M54.9, G89.29]    SUBJECTIVE  Pain Level (0-10 scale):  2  Any medication changes, allergies to medications, adverse drug reactions, diagnosis change, or new procedure performed?: [x] No    [] Yes (see summary sheet for update)  Subjective functional status/changes:   [] No changes reported  Pain is still 6/10 at the worst.   Worse: sitting, morning  Better: laying supine, hot bath, stretching, heat  No LE noted  Weakness: R LE     OBJECTIVE  Modality rationale: decrease pain and increase tissue extensibility to improve the patients ability to sit in class, workout    Min Type Additional Details    [] Estim: []Att   []Unatt        []TENS instruct                  []IFC  []Premod   []NMES                     []Other:  []w/US   []w/ice   []w/heat  Position:  Location:    []  Traction: [] Cervical       []Lumbar                       [] Prone          []Supine                       []Intermittent   []Continuous Lbs:  [] before manual  [] after manual    []  Ultrasound: []Continuous   [] Pulsed                           []1MHz   []3MHz Location:  W/cm2:    []  Iontophoresis with dexamethasone         Location: [] Take home patch   [] In clinic   PD []  Ice     [x]  heat  []  Ice massage Position:  Location:    []  Vasopneumatic Device Pressure:       [] lo [] med [] hi   Temperature: [] lo [] med [] hi   [x] Skin assessment post-treatment:  [x]intact []redness- no adverse reaction       []redness  adverse reaction:       40 min Therapeutic Exercise:  [x] See flow sheet :   Rationale: increase ROM, increase strength and improve coordination to improve the patients ability to improve return to all ADLs and sport     55 min Manual Therapy:   R upslip correction. Required 3 repetitions to correct. Min R posterior innom: with shotgun; DTm to R QL and glute med and max    Rationale: decrease pain, increase ROM and increase tissue extensibility to improve mobility and ADLs, sport             x min Patient Education: [x] Review HEP    [x] Progressed/Changed HEP based on:     R posterior innom correction with glute activation and bridges for glute re-education  Gave pt IMT information for NV if in agreement and MD signature      Other Objective/Functional Measures:   Cavitation in the R ankle with Long axis traction. No pain noted following. Asymmetrical bridge with poor motor control    REIL with R shift (hips to left):   Pain Level (0-10 scale) post treatment: 0    ASSESSMENT/Changes in Function:  Inconclusive sxs of disc involvement. Pt with increasing ROM and good response to REIL with L hip shift to addres R sided pain. Still with significant TTP and TrP in the R GLute and QL     Patient will continue to benefit from skilled PT services to modify and progress therapeutic interventions, address functional mobility deficits, address ROM deficits, address strength deficits, analyze and address soft tissue restrictions, analyze and cue movement patterns, analyze and modify body mechanics/ergonomics, assess and modify postural abnormalities and instruct in home and community integration to attain remaining goals. []  See Plan of Care  []  See progress note/recertification  []  See Discharge Summary         Progress towards goals / Updated goals: · Short Term Goals: To be accomplished in 1 weeks:  1. Pt will be independent and compliant with HEP Pt notes compliance with HEP; updated today  (3/27/17). · Long Term Goals: To be accomplished in 8-12 treatments:  1.  Patient will increase FOTO score to 77/100 for indications of increased functional mobility. 2. Patient will demo 4+/5 hip extension strength for improved posterior chain core strength for prolonged sitting asymmetrical glute activation with bridge, able to improve with VC and repetitions (3/27/17)  3. Patient will report 50% ease with sitting in class for progression to PLOF ADdressed with REIL with pt OP which abolished pt's pain today and pt notes understanding of using throughout the day to self-manage pain (3/13/17)  4.  Patient will demo negative long sit test to indicate improved pelvic stability for return to dead lifting /exercise Innom asymmetry today, able to correct 3/27/17    PLAN  [x]  Upgrade activities as tolerated     []  Continue plan of care  []  Update interventions per flow sheet       []  Discharge due to:_  [x]  Other:_  IMT NV if MD signature (QL and glute); assess response to REIL with R shift (hips to the left)    Zac Pillai, PT 3/27/2017  7:43 AM

## 2017-03-31 ENCOUNTER — HOSPITAL ENCOUNTER (OUTPATIENT)
Dept: PHYSICAL THERAPY | Age: 22
Discharge: HOME | End: 2017-03-31
Payer: OTHER GOVERNMENT

## 2017-03-31 PROCEDURE — 97140 MANUAL THERAPY 1/> REGIONS: CPT

## 2017-03-31 PROCEDURE — 97110 THERAPEUTIC EXERCISES: CPT

## 2017-03-31 NOTE — PROGRESS NOTES
PT DAILY TREATMENT NOTE     Patient Name: Helene Gilford  Date:3/31/2017  : 1995  [x]  Patient  Verified  Payor:  / Plan: [employer] [facility] [address] AND DEPENDENTS / Product Type:  /    In time:730  Out time:830  Total Treatment Time (min): 60  Visit #: 6 of     Treatment Area: Chronic back pain [M54.9, G89.29]    SUBJECTIVE  Pain Level (0-10 scale): 4  Any medication changes, allergies to medications, adverse drug reactions, diagnosis change, or new procedure performed?: [x] No    [] Yes (see summary sheet for update)  Subjective functional status/changes:   [] No changes reported  \"I dont think I want to do the needling.   I watch some videos and talked to my mom ([place])\"    OBJECTIVE  Modality rationale: decrease pain and increase tissue extensibility to improve the patients ability to sit in class, workout    Min Type Additional Details    [] Estim: []Att   []Unatt        []TENS instruct                  []IFC  []Premod   []NMES                     []Other:  []w/US   []w/ice   []w/heat  Position:  Location:    []  Traction: [] Cervical       []Lumbar                       [] Prone          []Supine                       []Intermittent   []Continuous Lbs:  [] before manual  [] after manual    []  Ultrasound: []Continuous   [] Pulsed                           []1MHz   []3MHz Location:  W/cm2:    []  Iontophoresis with dexamethasone         Location: [] Take home patch   [] In clinic   PD []  Ice     [x]  heat  []  Ice massage Position:  Location:    []  Vasopneumatic Device Pressure:       [] lo [] med [] hi   Temperature: [] lo [] med [] hi   [x] Skin assessment post-treatment:  [x]intact []redness- no adverse reaction       []redness  adverse reaction:       50 min Therapeutic Exercise:  [x] See flow sheet :   Rationale: increase ROM, increase strength and improve coordination to improve the patients ability to improve return to all ADLs and sport     10 min Manual Therapy:   SI check - equal today - negative long sit , DTM R QL and paraspinal and sacral float   Rationale: decrease pain, increase ROM and increase tissue extensibility to improve mobility and ADLs, sport           x min Patient Education: [x] Review HEP       Other Objective/Functional Measures:    Patient compliant with REIL at home as well as self MET    LTG 3 assessed    Pain Level (0-10 scale) post treatment: 0    ASSESSMENT/Changes in Function:  Patient declined IMT at this time. Patient is making progress with conservative treatment and was educated that we can reassess need for IMT later if needed. Core instability noted with bridge. Patient reports feeling more \" balanced\" with hips and standing. Patient will continue to benefit from skilled PT services to modify and progress therapeutic interventions, address functional mobility deficits, address ROM deficits, address strength deficits, analyze and address soft tissue restrictions, analyze and cue movement patterns, analyze and modify body mechanics/ergonomics, assess and modify postural abnormalities and instruct in home and community integration to attain remaining goals. [x]  See Plan of Care  []  See progress note/recertification  []  See Discharge Summary         Progress towards goals / Updated goals: · Short Term Goals: To be accomplished in 1 weeks:  1. Pt will be independent and compliant with HEP Pt notes compliance with HEP; updated today  (3/27/17). · Long Term Goals: To be accomplished in 8-12 treatments:  1. Patient will increase FOTO score to 77/100 for indications of increased functional mobility. 2. Patient will demo 4+/5 hip extension strength for improved posterior chain core strength for prolonged sitting asymmetrical glute activation with bridge, able to improve with VC and repetitions (3/27/17)  3.  Patient will report 50% ease with sitting in class for progression to PLOF goal me t- patient reports 70% improvement in sitting 3/31/17  4.  Patient will demo negative long sit test to indicate improved pelvic stability for return to dead lifting /exercise Innom asymmetry today, able to correct 3/27/17    PLAN  [x]  Upgrade activities as tolerated     []  Continue plan of care  []  Update interventions per flow sheet       []  Discharge due to:_  [x]  Other:_  Cont to challenge core and hip strength     Tristen Alicia, PT 3/31/2017

## 2017-04-03 ENCOUNTER — HOSPITAL ENCOUNTER (OUTPATIENT)
Dept: PHYSICAL THERAPY | Age: 22
Discharge: HOME | End: 2017-04-03
Payer: OTHER GOVERNMENT

## 2017-04-03 PROCEDURE — 97110 THERAPEUTIC EXERCISES: CPT

## 2017-04-03 PROCEDURE — 97140 MANUAL THERAPY 1/> REGIONS: CPT

## 2017-04-03 NOTE — PROGRESS NOTES
PT DAILY TREATMENT NOTE     Patient Name: Justin Salinas  Date:4/3/2017  : 1995  [x]  Patient  Verified  Payor:  / Plan: [employer] [facility] [address] AND DEPENDENTS / Product Type:  /    In time:800 Out time:850  Total Treatment Time (min): 50  Visit #: 7 of     Treatment Area: Chronic back pain [M54.9, G89.29]    SUBJECTIVE  Pain Level (0-10 scale): 4  Any medication changes, allergies to medications, adverse drug reactions, diagnosis change, or new procedure performed?: [x] No    [] Yes (see summary sheet for update)  Subjective functional status/changes:   [] No changes reported  \"I am feeling good. \"    OBJECTIVE  Modality rationale: decrease pain and increase tissue extensibility to improve the patients ability to sit in class, workout    Min Type Additional Details    [] Estim: []Att   []Unatt        []TENS instruct                  []IFC  []Premod   []NMES                     []Other:  []w/US   []w/ice   []w/heat  Position:  Location:    []  Traction: [] Cervical       []Lumbar                       [] Prone          []Supine                       []Intermittent   []Continuous Lbs:  [] before manual  [] after manual    []  Ultrasound: []Continuous   [] Pulsed                           []1MHz   []3MHz Location:  W/cm2:    []  Iontophoresis with dexamethasone         Location: [] Take home patch   [] In clinic   PD []  Ice     [x]  heat  []  Ice massage Position:  Location:    []  Vasopneumatic Device Pressure:       [] lo [] med [] hi   Temperature: [] lo [] med [] hi   [x] Skin assessment post-treatment:  [x]intact []redness- no adverse reaction       []redness  adverse reaction:       38 min Therapeutic Exercise:  [x] See flow sheet :   Rationale: increase ROM, increase strength and improve coordination to improve the patients ability to improve return to all ADLs and sport      12 min Manual Therapy:  SI check - long sit positive for L rotation , corrected with MET, SL QL release and LS rotation manipulation. Rationale: decrease pain, increase ROM and increase tissue extensibility to improve mobility and ADLs, sport           x min Patient Education: [x] Review HEP       Other Objective/Functional Measures:    LTG 4- long sit - positive - L LE equal to long    Progress hip hike to stairs with 3# ankle wt to challenge SI stability     Pain Level (0-10 scale) post treatment: 0    ASSESSMENT/Changes in Function:  Patient tolerated treatment progression well. LTR achieved with SL QL release today. Patient reports feeling good despite 4/10 pain scale. Patient educated on reassessment NV - likely cont for core strengthening. TC on one therex sec to requesting early release     Patient will continue to benefit from skilled PT services to modify and progress therapeutic interventions, address functional mobility deficits, address ROM deficits, address strength deficits, analyze and address soft tissue restrictions, analyze and cue movement patterns, analyze and modify body mechanics/ergonomics, assess and modify postural abnormalities and instruct in home and community integration to attain remaining goals. [x]  See Plan of Care  []  See progress note/recertification  []  See Discharge Summary         Progress towards goals / Updated goals: · Short Term Goals: To be accomplished in 1 weeks:  1. Pt will be independent and compliant with HEP Pt notes compliance with HEP; updated today  (3/27/17). · Long Term Goals: To be accomplished in 8-12 treatments:  1. Patient will increase FOTO score to 77/100 for indications of increased functional mobility. 2. Patient will demo 4+/5 hip extension strength for improved posterior chain core strength for prolonged sitting asymmetrical glute activation with bridge, able to improve with VC and repetitions (3/27/17)  3.  Patient will report 50% ease with sitting in class for progression to PLOF goal me t- patient reports 70% improvement in sitting 3/31/17  4. Patient will demo negative long sit test to indicate improved pelvic stability for return to dead lifting /exercise goal progressing - mild deviation noted today -easily corrected  4/3/17    PLAN  [x]  Upgrade activities as tolerated     []  Continue plan of care  []  Update interventions per flow sheet       []  Discharge due to:_  [x]  Other:_  PN/ 30 day assessment due NV - cont for core strengthening 1 vs2 times per week.     Corbett Riedel, PT 4/3/2017

## 2017-04-06 ENCOUNTER — HOSPITAL ENCOUNTER (OUTPATIENT)
Dept: PHYSICAL THERAPY | Age: 22
End: 2017-04-06
Payer: OTHER GOVERNMENT

## 2017-04-10 ENCOUNTER — HOSPITAL ENCOUNTER (OUTPATIENT)
Dept: PHYSICAL THERAPY | Age: 22
Discharge: HOME | End: 2017-04-10
Payer: OTHER GOVERNMENT

## 2017-04-10 PROCEDURE — 97110 THERAPEUTIC EXERCISES: CPT

## 2017-04-10 PROCEDURE — 97140 MANUAL THERAPY 1/> REGIONS: CPT

## 2017-04-10 NOTE — PROGRESS NOTES
[address] [facility] AT [address] [facility], [place] [address]  Phone: [phone] Fax: [phone]  PROGRESS NOTE  Patient Name: Helene Gilford : 1995   Treatment/Medical Diagnosis: Chronic back pain [M54.9, G89.29]   Referral Source: Nixon Hatch MD     Date of Initial Visit: 3/7/17 Attended Visits: 8 Missed Visits: 1     SUMMARY OF TREATMENT  Patient is being treated for co LS pain exacerbated by heavy lifting in the gym. Patient treatment has included progressive therex for flexibility and core/ hip strengthening and manual DTM. CURRENT STATUS  Patient is progressing well with PT. Overall pain and function has improved, however patient has continual pelvic / core/ hip instability and poor strength. Other assessment as follows:  \"Functional improvements: balance is better; less shifted; can sit for longer without shifting; no trouble with sleeping\"  Deficits: lifting mechanics (with back instead of legs); sitting 5-10 minutes; standing 5 minutes  Pain at best 3, at worst 8  Subjective % improvement 80%  Objective:    Hip strength : ABD R 4+/5 glute med and TFL; L 3+/5 Glute med, 4+/5 TFL, extension 4+/5 (B)   Core : TA cuff static maintains 70mmHg, with march increases 10mmHg L leg, 15mmHg R LE   Long sit/ pelvic symmetry L ant innom    · Short Term Goals: To be accomplished in 1 weeks:  1. Pt will be independent and compliant with HEP Pt notes compliance with HEP; updated today (4/10/17)  · Long Term Goals: To be accomplished in 8-12 treatments:  1. Patient will increase FOTO score to 77/100 for indications of increased functional mobility goal not progressing at 61/100 (4/10/17)   2. Patient will demo 4+/5 hip extension strength for improved posterior chain core strength for prolonged sitting goal met at 4+/5 (B) 4/10/17  3.  Patient will report 50% ease with sitting in class for progression to PLOF goal met- patient reports 70% improvement in sitting 3/31/17; and 80% overall progression (4/10/17)  4. Patient will demo negative long sit test to indicate improved pelvic stability for return to dead lifting /exercise goal intermittently progressing (4/10/17) with inconsistent alignment         New Goals to be achieved in __4-8__  treatments:  1. Patient will increase FOTO score to 77/100 for indications of increased functional mobility    2. Patient will demo negative long sit test to indicate improved pelvic stability for return to dead lifting /exercise    3. Pt will be independent and compliant with HEP    4. Pt will have an increase in L glute med strength to > or = 4+/5 to improve stability of the pelvis in stance, sport, gait      G-Codes: NA  RECOMMENDATIONS  Patient to continue 1-2 times per week for 4-8 sessions as needed for progression to goal attainment and increased functional activity tolerance. If you have any questions/comments please contact us directly at [phone]. Thank you for allowing us to assist in the care of your patient. Therapist Signature: Xi Mariee PT Date: 4/10/2017     Time: 11:51 AM   NOTE TO PHYSICIAN:  PLEASE COMPLETE THE ORDERS BELOW AND FAX TO   [facility]: [phone]. If you are unable to process this request in 24 hours please contact our office: [phone].  ___ I have read the above report and request that my patient continue as recommended.   ___ I have read the above report and request that my patient continue therapy with the following changes/special instructions:_________________________________________________________   ___ I have read the above report and request that my patient be discharged from therapy.      Physician Signature:        Date:       Time:

## 2017-04-10 NOTE — PROGRESS NOTES
PT DAILY TREATMENT NOTE     Patient Name: Cristofer Goddard  Date:4/10/2017  : 1995  [x]  Patient  Verified  Payor:  / Plan: [employer] [facility] [address] AND DEPENDENTS / Product Type: [employer] /    In time:9:00 Out time:9:58  Total Treatment Time (min): 62  Visit #: 8 of     Treatment Area: Chronic back pain [M54.9, G89.29]    SUBJECTIVE  Pain Level (0-10 scale): 3  Any medication changes, allergies to medications, adverse drug reactions, diagnosis change, or new procedure performed?: [x] No    [] Yes (see summary sheet for update)  Subjective functional status/changes:   [] No changes reported  \"Functional improvements: balance is better; less shifted; can sit for longer without shifting; no trouble with sleeping\"  Deficits: lifting mechanics (with back instead of legs); sitting 5-10 minutes; standing 5 minutes    \"worked out and did a deadlift wrong and pulled my R lower back (points to QL)\"    OBJECTIVE  Modality rationale: decrease pain and increase tissue extensibility to improve the patients ability to sit in class, workout    Min Type Additional Details    [] Estim: []Att   []Unatt        []TENS instruct                  []IFC  []Premod   []NMES                     []Other:  []w/US   []w/ice   []w/heat  Position:  Location:    []  Traction: [] Cervical       []Lumbar                       [] Prone          []Supine                       []Intermittent   []Continuous Lbs:  [] before manual  [] after manual    []  Ultrasound: []Continuous   [] Pulsed                           []1MHz   []3MHz Location:  W/cm2:    []  Iontophoresis with dexamethasone         Location: [] Take home patch   [] In clinic   PD []  Ice     [x]  heat  []  Ice massage Position:  Location:    []  Vasopneumatic Device Pressure:       [] lo [] med [] hi   Temperature: [] lo [] med [] hi   [x] Skin assessment post-treatment:  [x]intact []redness- no adverse reaction       []redness  adverse reaction:       48 min Therapeutic Exercise:  [x] See flow sheet : reassessment - completed FOTO with patient; added LE extension for bird dog with VC for limited ROM to avoid pelvic compensation and lumbar hyperextension    Rationale: increase ROM, increase strength and improve coordination to improve the patients ability to improve return to all ADLs and sport      10 min Manual Therapy:  SI check - L anterior innom. Shotgun and MET. DTm to R QL, R QL and lumbar rotation stretch. Rationale: decrease pain, increase ROM and increase tissue extensibility to improve mobility and ADLs, sport           x min Patient Education: [x] Review HEP       Other Objective/Functional Measures:    Goals assessed for continuation   Pain at best 3, at worst 8  Subjective % improvement 80%  Objective:    Hip strength : ABD R 4+/5 glute med and TFL; L 3+/5 Glute med, 4+/5 TFL, extension 4+/5 (B)   Core : TA cuff static maintains 70mmHg, with march increases 10mmHg L leg, 15mmHg R LE   Long sit/ pelvic symmetry L ant innom    Initiate L glute med retraining, SL abduction, avoiding hip hiking. Pain Level (0-10 scale) post treatment: 0    ASSESSMENT/Changes in Function: SEE PN     Patient will continue to benefit from skilled PT services to modify and progress therapeutic interventions, address functional mobility deficits, address ROM deficits, address strength deficits, analyze and address soft tissue restrictions, analyze and cue movement patterns, analyze and modify body mechanics/ergonomics, assess and modify postural abnormalities and instruct in home and community integration to attain remaining goals. []  See Plan of Care  [x]  See progress note/recertification 8/21/20  []  See Discharge Summary         Progress towards goals / Updated goals: · Short Term Goals: To be accomplished in 1 weeks:  1. Pt will be independent and compliant with HEP Pt notes compliance with HEP; updated today (4/10/17)  · Long Term Goals:  To be accomplished in 8-12 treatments:  1. Patient will increase FOTO score to 77/100 for indications of increased functional mobility goal not progressing at 61/100 (4/10/17)   2. Patient will demo 4+/5 hip extension strength for improved posterior chain core strength for prolonged sitting goal met at 4+/5 (B) 4/10/17  3. Patient will report 50% ease with sitting in class for progression to PLOF goal met- patient reports 70% improvement in sitting 3/31/17; and 80% overall progression (4/10/17)  4.  Patient will demo negative long sit test to indicate improved pelvic stability for return to dead lifting /exercise goal intermittently progressing (4/10/17) with inconsistent alignment     PLAN  [x]  Upgrade activities as tolerated     []  Continue plan of care  []  Update interventions per flow sheet       []  Discharge due to:_  [x]  Other:_ SEE PN - cont 2x/week for 4-8 sessions     Ana Laura Rodriges, PT 4/10/2017

## 2017-04-17 ENCOUNTER — HOSPITAL ENCOUNTER (OUTPATIENT)
Dept: PHYSICAL THERAPY | Age: 22
Discharge: HOME | End: 2017-04-17
Payer: OTHER GOVERNMENT

## 2017-04-17 ENCOUNTER — APPOINTMENT (OUTPATIENT)
Dept: PHYSICAL THERAPY | Age: 22
End: 2017-04-17
Payer: OTHER GOVERNMENT

## 2017-04-17 PROCEDURE — 97140 MANUAL THERAPY 1/> REGIONS: CPT

## 2017-04-17 PROCEDURE — 97110 THERAPEUTIC EXERCISES: CPT

## 2017-04-21 ENCOUNTER — HOSPITAL ENCOUNTER (OUTPATIENT)
Dept: PHYSICAL THERAPY | Age: 22
Discharge: HOME | End: 2017-04-21
Payer: OTHER GOVERNMENT

## 2017-04-21 ENCOUNTER — APPOINTMENT (OUTPATIENT)
Dept: PHYSICAL THERAPY | Age: 22
End: 2017-04-21
Payer: OTHER GOVERNMENT

## 2017-04-21 PROCEDURE — 97110 THERAPEUTIC EXERCISES: CPT

## 2017-04-21 PROCEDURE — 97140 MANUAL THERAPY 1/> REGIONS: CPT

## 2017-04-21 NOTE — PROGRESS NOTES
PT DAILY TREATMENT NOTE     Patient Name: zEe Vera  Date:2017  : 1995  [x]  Patient  Verified  Payor:  / Plan: [facility] [address] AND DEPENDENTS / Product Type:  /    In time:725  Out time:820  Total Treatment Time (min): 54  Visit #: 2 of -8    Treatment Area: Chronic back pain [M54.9, G89.29]    SUBJECTIVE  Pain Level (0-10 scale): 3  Any medication changes, allergies to medications, adverse drug reactions, diagnosis change, or new procedure performed?: [x] No    [] Yes (see summary sheet for update)  Subjective functional status/changes:   [] No changes reported  \"Good just tired. \"     OBJECTIVE        Modality rationale: decrease pain and increase tissue extensibility to improve the patients ability to improve activity tolerance   Min Type Additional Details   attempted- 3 min [x] HV Estim with US []w/US []w/ice []w/heat  Position:  Location:       44 min Therapeutic Exercise:  [x] See flow sheet    Rationale: increase ROM, increase strength and improve coordination to improve the patients ability to improve stability, mechanics with lifting, sitting     8 min Manual Therapy:  SI check - equal after 15 PPU. REIL with PT OP    Rationale: decrease pain, increase ROM, increase tissue extensibility and decrease trigger points to improve mobility for sport, sitting in class          x min Patient Education: [x] Review HEP   - encouraged neutral PPU only      Other Objective/Functional Measures:    No pelvic obliquity noted after neutral PPU, however after R shift PPU, pelvis rotated and patient was able to self correct via MET. Quad bird dog hip extension lacking 45 deg to neutral in order to maintain balance and core contraction     Pain Level (0-10 scale) post treatment: 0    ASSESSMENT/Changes in Function: Patient reports good compliance with MET for self correction.  Patient reports she feels \"something\" change when doing the correction, but doesn't always notice a change in symptoms. Initiated ES/US combo for localized QL release, however patient was unable to tolerate large contraction that it created. Attempted 2x at lower intensity with no tolerance. Patient will continue to benefit from skilled PT services to modify and progress therapeutic interventions, address functional mobility deficits, address ROM deficits, address strength deficits, analyze and address soft tissue restrictions, analyze and cue movement patterns, analyze and modify body mechanics/ergonomics, assess and modify postural abnormalities and instruct in home and community integration to attain remaining goals. []  See Plan of Care  [x]  See progress note/recertification 0/87/89  []  See Discharge Summary         Progress towards goals / Updated goals:  1. Patient will increase FOTO score to 77/100 for indications of increased functional mobility    2. Patient will demo negative long sit test to indicate improved pelvic stability for return to dead lifting /exercise  (L) anerior innom today, corrected with MET (4/17/17)   3. Pt will be independent and compliant with HEP    4. Pt will have an increase in L glute med strength to > or = 4+/5 to improve stability of the pelvis in stance, sport, gait  Improved motor control and response to HEP SL abductions (4/17/17)       PLAN  [x]  Upgrade activities as tolerated     []  Continue plan of care  []  Update interventions per flow sheet       []  Discharge due to:_  [x]  Other:_ Patient interested in trying comobo again - attempt with PM instead of HV?    Cont core and glut strengthening    Ritu Roberts PT 4/21/2017  7:41 AM

## 2017-04-24 ENCOUNTER — HOSPITAL ENCOUNTER (OUTPATIENT)
Dept: PHYSICAL THERAPY | Age: 22
Discharge: HOME | End: 2017-04-24
Payer: OTHER GOVERNMENT

## 2017-04-24 PROCEDURE — 97110 THERAPEUTIC EXERCISES: CPT

## 2017-04-24 PROCEDURE — 97140 MANUAL THERAPY 1/> REGIONS: CPT

## 2017-04-24 NOTE — PROGRESS NOTES
PT DAILY TREATMENT NOTE     Patient Name: Sirisha Mack  Date:2017  : 1995  [x]  Patient  Verified  Payor:  / Plan: [email or web address] [address] AND DEPENDENTS / Product Type:  /    In time:75:8  Out time:8:55  Total Treatment Time (min): 57  Total Timed Codes (min): 57  1:1 Treatment Time (min): 57   Visit #: 3 of     Treatment Area: Chronic back pain [M54.9, G89.29]    SUBJECTIVE  Pain Level (0-10 scale): 0  Any medication changes, allergies to medications, adverse drug reactions, diagnosis change, or new procedure performed?: [x] No    [] Yes (see summary sheet for update)  Subjective functional status/changes:   [] No changes reported  No pain today, just stiffness  I had pain on saturday, 5/10 at the worse, resolved with ibuprofin. Doing the regular pressups and they help . Squats are hard, back squats are harder than front squats. OBJECTIVE  Modality rationale: decrease pain and increase tissue extensibility to improve the patients ability to improve mobility ,pelvic symmetry    Min Type Additional Details   PD [x] Estim:                   []IFC  [x]Premod with US, combo            []w/US   []w/ice   []w/heat  Position:  Location:   [] Skin assessment post-treatment:  []intact []redness- no adverse reaction       []redness  adverse reaction:       43 min Therapeutic Exercise:  [x] See flow sheet : initiate squats for safe and functional return to lifting at the gym    Rationale: increase ROM, increase strength and improve coordination to improve the patients ability to improve     14 min Manual Therapy:  Minimal L innom rotation. Shotgun with no cavitation and MET to attain neutral. DTm and TrP to the R QL.     Rationale: decrease pain, increase ROM and increase tissue extensibility to improve mobility for sport, sitting           x min Patient Education: [x] Review HEP    [] Progressed/Changed HEP based on:   [] positioning   [] body mechanics   [] transfers [] heat/ice application       Warm-up at the gym to consist of prone hip extensions, followed by QP multifidus and then band glute prep      Other Objective/Functional Measures:   trP in the R QL, resolved with MT  MET able to correct innom    Initiated treatment with prone hip extensions, Tactile cues to improve glute max activation and avoid HS and back extensor dominant movement   VC required for form with tband IR and ER to avoid toe out  MMT (B) glute max, glute med 4+/5    Bird dogs: LE is 30 from neutral      Pain Level (0-10 scale) post treatment: 0    ASSESSMENT/Changes in Function: improved QL tension and dcreased TrP after MT. Improved mm firing patterns after cues to improve hip extension with glute max firing. Progressing towards goal #4. Demo's an improvement in control with bird dogs. Can perform a WNL squat with no pain after VC to activate glute max for the return to stand. Patient will continue to benefit from skilled PT services to modify and progress therapeutic interventions, address functional mobility deficits, address ROM deficits, address strength deficits, analyze and address soft tissue restrictions, analyze and cue movement patterns, analyze and modify body mechanics/ergonomics, assess and modify postural abnormalities and instruct in home and community integration to attain remaining goals. []  See Plan of Care  []  See progress note/recertification  []  See Discharge Summary         Progress towards goals / Updated goals:  1. Patient will increase FOTO score to 77/100 for indications of increased functional mobility    2. Patient will demo negative long sit test to indicate improved pelvic stability for return to dead lifting /exercise  (L) anerior innom today, corrected with MET (4/24/17)   3. Pt will be independent and compliant with HEP  Progresssed with motor control HEP (hip ext, QP multif) (4/24/17)   4.   Pt will have an increase in L glute med strength to > or = 4+/5 to improve stability of the pelvis in stance, sport, gait improved motor control, 4+/5 (B) (4/24/17)         PLAN  [x]  Upgrade activities as tolerated     []  Continue plan of care  []  Update interventions per flow sheet       []  Discharge due to:_  [x]  Other:_  Assess response to squats after doing glute prep work,     Harleen Phillips, PT 4/24/2017  7:31 AM

## 2017-04-28 ENCOUNTER — APPOINTMENT (OUTPATIENT)
Dept: PHYSICAL THERAPY | Age: 22
End: 2017-04-28
Payer: OTHER GOVERNMENT

## 2017-04-28 ENCOUNTER — HOSPITAL ENCOUNTER (OUTPATIENT)
Dept: PHYSICAL THERAPY | Age: 22
End: 2017-04-28
Payer: OTHER GOVERNMENT

## 2017-05-01 ENCOUNTER — HOSPITAL ENCOUNTER (OUTPATIENT)
Dept: PHYSICAL THERAPY | Age: 22
Discharge: HOME | End: 2017-05-01
Payer: OTHER GOVERNMENT

## 2017-05-01 PROCEDURE — 97140 MANUAL THERAPY 1/> REGIONS: CPT

## 2017-05-01 PROCEDURE — 97110 THERAPEUTIC EXERCISES: CPT

## 2017-05-01 NOTE — PROGRESS NOTES
PT DAILY TREATMENT NOTE 8-    Patient Name: Raj Moore  Date:2017  : 1995  [x]  Patient  Verified  Payor:  / Plan: [facility]  ACTIVE DUTY AND DEPENDENTS / Product Type:  /    In time:756 Out time:842  Total Treatment Time (min): 46  Visit #: 4 of 4-8    Treatment Area: Chronic back pain [M54.9, G89.29]    SUBJECTIVE  Pain Level (0-10 scale): 5  Any medication changes, allergies to medications, adverse drug reactions, diagnosis change, or new procedure performed?: [x] No    [] Yes (see summary sheet for update)  Subjective functional status/changes:   [] No changes reported  \"Its ok. I slept on the couch last night so. . Its ok. \"    OBJECTIVE  Modality rationale: decrease pain and increase tissue extensibility to improve the patients ability to improve mobility ,pelvic symmetry    Min Type Additional Details   PD [x] Estim:                   []IFC  [x]Premod with US, combo            []w/US   []w/ice   []w/heat  Position:  Location:   [x] Skin assessment post-treatment:  [x]intact []redness- no adverse reaction       []redness  adverse reaction:       38 min Therapeutic Exercise:  [x] See flow sheet :   Rationale: increase ROM, increase strength and improve coordination to improve the patients ability to improve     8 min Manual Therapy:  SI check - very slight L ant rotation and L hip IR - corrected with L piriformis stretch and distraction, recheck to symmetrical     Rationale: decrease pain, increase ROM and increase tissue extensibility to improve mobility for sport, sitting           x min Patient Education: [x] Review HEP       Other Objective/Functional Measures:    Patient reports sleeping on the couch last night     Patient demo increased L piriformis tightness - easily released    Added BOSU squat for core and hip stability      Pain Level (0-10 scale) post treatment: 0    ASSESSMENT/Changes in Function: Patient reports squatting after glut training last session was better with glut warm up and proper form. Significant LE instability noted with BOSU squat that improved with practice. VCing for for core stability and glut contraction. Patient will continue to benefit from skilled PT services to modify and progress therapeutic interventions, address functional mobility deficits, address ROM deficits, address strength deficits, analyze and address soft tissue restrictions, analyze and cue movement patterns, analyze and modify body mechanics/ergonomics, assess and modify postural abnormalities and instruct in home and community integration to attain remaining goals. []  See Plan of Care  [x]  See progress note/recertification 3/80/32  []  See Discharge Summary         Progress towards goals / Updated goals: All goals reviewed and progressing appropriately as of 5/1/2017   1. Patient will increase FOTO score to 77/100 for indications of increased functional mobility    2. Patient will demo negative long sit test to indicate improved pelvic stability for return to dead lifting /exercise  (L) anerior innom today, corrected with MET (4/24/17)   3. Pt will be independent and compliant with HEP  Progresssed with motor control HEP (hip ext, QP multif) (4/24/17)   4.   Pt will have an increase in L glute med strength to > or = 4+/5 to improve stability of the pelvis in stance, sport, gait improved motor control, 4+/5 (B) (4/24/17)       PLAN  [x]  Upgrade activities as tolerated     []  Continue plan of care  []  Update interventions per flow sheet       []  Discharge due to:_  [x]  Other:_[employer] encouraged to schedule one more apt before leaving    Patient going out of town at the end of the week for 1 month to [address] [place] [address] prior to going to [facility] for comprehensive HEP     Shereen Joe, PT 5/1/2017

## 2017-05-04 ENCOUNTER — HOSPITAL ENCOUNTER (OUTPATIENT)
Dept: PHYSICAL THERAPY | Age: 22
Discharge: HOME | End: 2017-05-04
Payer: OTHER GOVERNMENT

## 2017-05-04 PROCEDURE — 97110 THERAPEUTIC EXERCISES: CPT

## 2017-05-04 NOTE — PROGRESS NOTES
[address] [facility] AT [address]  Phone: [phone] Fax: [phone]  DISCHARGE NOTE   Patient Name: Tiffani Murillo : 1995   Treatment/Medical Diagnosis: Chronic back pain [M54.9, G89.29]   Referral Source: Margoth Cueto MD     Date of Initial Visit: 3/7/17 Attended Visits: 13 Missed Visits: 2     SUMMARY OF TREATMENT  Patient was being treated for co LS pain exacerbated by heavy lifting in the gym. Patient treatment has included progressive therex for flexibility and core/ hip strengthening and manual DTM. CURRENT STATUS  Patient made good progress with PT and will need continued core strengthening and stability, but patient is comfortable with continuation via HEP as patient will be in [facility] for one month on vacation. Assessment as follows:  Pain at best 0, at worst 4-5  Subjective % improvement 90%  Objective:   Long sit test for pelvic instability / rotation : negative   Core with TA cuff :  8 mmGH, L 4 mmGH   Hip strength 5/5 ABD 4+/5 B, extension 5/5, glut max 5/5  Improvements: sitting tolerance improved, postural awareness of compensatory patterns   Deficits : compensatory pattern    1. Patient will increase FOTO score to 77/100 for indications of increased functional mobility goal met - FOTO increased to 82/100   2. Patient will demo negative long sit test to indicate improved pelvic stability for return to dead lifting /exercise  Goal met - negative long sit today    3. Pt will be independent and compliant with HEP Goal met - patient has completely updated HEP for continuation for self treatment    4. Pt will have an increase in L glute med strength to > or = 4+/5 to improve stability of the pelvis in stance, sport, gait goal met - patient demo 4+/5 glut med strength        G-Codes: NA  RECOMMENDATIONS  Patient will be DC to HEP.    If you have any questions/comments please contact us directly at [phone]. Thank you for allowing us to assist in the care of your patient. Therapist Signature: Vashti Pedro PT Date: 5/4/2017     Time: 11:51 AM   NOTE TO PHYSICIAN:  PLEASE COMPLETE THE ORDERS BELOW AND FAX TO   [facility]: [phone]. If you are unable to process this request in 24 hours please contact our office: [phone].  ___ I have read the above report and request that my patient continue as recommended.   ___ I have read the above report and request that my patient continue therapy with the following changes/special instructions:_________________________________________________________   ___ I have read the above report and request that my patient be discharged from therapy.      Physician Signature:        Date:       Time:

## 2017-05-04 NOTE — PROGRESS NOTES
PT DAILY TREATMENT NOTE     Patient Name: Debi Avalos  Date:2017  : 1995  [x]  Patient  Verified  Payor:  / Plan: [facility] [address] AND DEPENDENTS / Product Type:  /    In time:400 Out time:425  Total Treatment Time (min): 25  Visit #: 5 of 4-8    Treatment Area: Chronic back pain [M54.9, G89.29]    SUBJECTIVE  Pain Level (0-10 scale): 0  Any medication changes, allergies to medications, adverse drug reactions, diagnosis change, or new procedure performed?: [x] No    [] Yes (see summary sheet for update)  Subjective functional status/changes:   [] No changes reported  \"Feeling good today. \"    OBJECTIVE  Modality rationale: decrease pain and increase tissue extensibility to improve the patients ability to improve mobility ,pelvic symmetry    Min Type Additional Details   PD [x] Estim:                   []IFC  [x]Premod with US, combo            []w/US   []w/ice   []w/heat  Position:  Location:   [x] Skin assessment post-treatment:  [x]intact []redness- no adverse reaction       []redness  adverse reaction:       25 min Therapeutic Exercise:  [x] See flow sheet : reassess - completed FOTO with patient    Rationale: increase ROM, increase strength and improve coordination to improve the patients ability to improve     0 min Manual Therapy:  NT   Rationale: decrease pain, increase ROM and increase tissue extensibility to improve mobility for sport, sitting           x min Patient Education: [x] Review HEP- updated for DC        Other Objective/Functional Measures:    Goals assessed for DC  Pain at best 0, at worst 4-5  Subjective % improvement 90%  Objective:    Long sit test for pelvic instability / rotation : negative    Core with TA cuff :  8 mmGH, L 4 mmGH    Hip strength 5/5 ABD 4+/5 B, extension 5/5, glut max 5/5  Improvements: sitting tolerance improved, postural awareness of compensatory patterns   Deficits : compensatory pattern    Pain Level (0-10 scale) post treatment: 0    ASSESSMENT/Changes in Function:   [x]  See Discharge Summary         Progress towards goals / Updated goals:   1. Patient will increase FOTO score to 77/100 for indications of increased functional mobility goal met - FOTO increased to 82/100   2. Patient will demo negative long sit test to indicate improved pelvic stability for return to dead lifting /exercise  Goal met - negative long sit today    3. Pt will be independent and compliant with HEP  Goal met - patient has completely updated HEP for continuation for self treatment    4.   Pt will have an increase in L glute med strength to > or = 4+/5 to improve stability of the pelvis in stance, sport, gait goal met  - patient demo 4+/5 glut med strength        PLAN     [x]  Discharge due to:_goals met/ progressing and program complete - patient vacationing to [facility] for 1 month       Anabela Hua, PT 5/4/2017

## 2017-11-16 ENCOUNTER — DOCUMENTATION ONLY (OUTPATIENT)
Dept: FAMILY MEDICINE CLINIC | Age: 22
End: 2017-11-16

## 2017-11-16 ENCOUNTER — OFFICE VISIT (OUTPATIENT)
Dept: FAMILY MEDICINE CLINIC | Age: 22
End: 2017-11-16

## 2017-11-16 VITALS
OXYGEN SATURATION: 96 % | SYSTOLIC BLOOD PRESSURE: 101 MMHG | DIASTOLIC BLOOD PRESSURE: 69 MMHG | WEIGHT: 131 LBS | TEMPERATURE: 98.6 F | HEART RATE: 77 BPM | HEIGHT: 66 IN | RESPIRATION RATE: 18 BRPM | BODY MASS INDEX: 21.05 KG/M2

## 2017-11-16 DIAGNOSIS — F41.1 GAD (GENERALIZED ANXIETY DISORDER): Primary | ICD-10-CM

## 2017-11-16 RX ORDER — VENLAFAXINE HYDROCHLORIDE 37.5 MG/1
37.5 CAPSULE, EXTENDED RELEASE ORAL DAILY
Qty: 30 CAP | Refills: 1 | Status: SHIPPED | OUTPATIENT
Start: 2017-11-16 | End: 2017-11-16 | Stop reason: SDUPTHER

## 2017-11-16 RX ORDER — VENLAFAXINE HYDROCHLORIDE 37.5 MG/1
37.5 CAPSULE, EXTENDED RELEASE ORAL DAILY
Qty: 30 CAP | Refills: 1 | Status: SHIPPED | OUTPATIENT
Start: 2017-11-16 | End: 2017-12-27 | Stop reason: SDUPTHER

## 2017-11-16 RX ORDER — NORETHINDRONE ACETATE AND ETHINYL ESTRADIOL .03; 1.5 MG/1; MG/1
TABLET ORAL
COMMUNITY

## 2017-11-16 NOTE — MR AVS SNAPSHOT
Visit Information Date & Time Provider Department Dept. Phone Encounter #  
 11/16/2017  5:00 PM Dima Kaylie Normanhumbertomaritza [phone] [id number] Follow-up Instructions Return in about 2 months (around 1/16/2018) for anxiety. Upcoming Health Maintenance Date Due Hepatitis A Peds Age 1-18 (1 of 2 - Standard Series) 10/10/1998 DTaP/Tdap/Td series (1 - Tdap) 10/10/2004 HPV AGE 9Y-26Y (1 of 3 - Female 3 Dose Series) 10/10/2008 Influenza Age 5 to Adult 8/1/2017 Allergies as of 11/16/2017  Review Complete On: 11/16/2017 By: Dima Hollis MD  
 No Known Allergies Current Immunizations  Never Reviewed No immunizations on file. Not reviewed this visit You Were Diagnosed With   
  
 Codes Comments JEREMY (generalized anxiety disorder)    -  Primary ICD-10-CM: F41.1 ICD-9-CM: 300.02 Vitals BP Pulse Temp Resp Height(growth percentile) Weight(growth percentile) 101/69 (BP 1 Location: Right arm, BP Patient Position: Sitting) 77 98.6 °F (37 °C) (Oral) 18 5' 6\" (1.676 m) 131 lb (59.4 kg) LMP SpO2 BMI OB Status Smoking Status 10/26/2017 (Approximate) 96% 21.14 kg/m2 Having regular periods Never Smoker Vitals History BMI and BSA Data Body Mass Index Body Surface Area  
 21.14 kg/m 2 1.66 m 2 Preferred Pharmacy Pharmacy Name Phone [facility] [phone] [facility] [address] [phone] Your Updated Medication List  
  
   
This list is accurate as of: 11/16/17  5:23 PM.  Always use your most recent med list.  
  
  
  
  
 cyclobenzaprine 10 mg tablet Commonly known as:  FLEXERIL Take 1 Tab by mouth nightly. Lisdexamfetamine 40 mg capsule Commonly known as:  VYVANSE Take 1 Cap (40 mg total) by mouth dailyEarliest Fill Date: 5/6/17. Max Daily Amount: 40 mg  
  
 MICROGESTIN 1.5/30 (21) 1.5-30 mg-mcg Tab Generic drug:  norethindrone ac-eth estradiol Take  by mouth. venlafaxine-SR 37.5 mg capsule Commonly known as:  EFFEXOR-XR Take 1 Cap by mouth daily. Prescriptions Sent to Pharmacy Refills  
 venlafaxine-SR (EFFEXOR-XR) 37.5 mg capsule 1 Sig: Take 1 Cap by mouth daily. Class: Normal  
 Pharmacy: [facility] [address] [place] [address]  [facility] #: [phone] Route: Oral  
  
Follow-up Instructions Return in about 2 months (around 1/16/2018) for anxiety. Patient Instructions Please start effexor every morning. Mychart or call me in 2-3 weeks with how things are going Venlafaxine (By mouth) Venlafaxine ([id number]) Treats depression, generalized anxiety disorder, panic disorder, and social anxiety disorder. Brand Name(s): Effexor XR There may be other brand names for this medicine. When This Medicine Should Not Be Used: This medicine is not right for everyone. Do not use it if you had an allergic reaction to venlafaxine or desvenlafaxine succinate. How to Use This Medicine:  
Long Acting Capsule, Tablet, Long Acting Tablet · Take your medicine as directed. Your dose may need to be changed several times to find what works best for you. · It is best to take the extended-release capsule at the same time each day (either in the morning or evening). · It is best to take this medicine with food or milk. · Swallow the extended-release capsule whole. Do not crush, break, or chew it. Do not place the capsule in a liquid. · If you cannot swallow the extended-release capsule, you may open it and pour the medicine into a small amount of soft food such as pudding, yogurt, or applesauce. Stir this mixture well and swallow it without chewing. · This medicine should come with a Medication Guide. Ask your pharmacist for a copy if you do not have one. · Missed dose: Take a dose as soon as you remember. If it is almost time for your next dose, wait until then and take a regular dose. Do not take extra medicine to make up for a missed dose. · Store the medicine in a closed container at room temperature, away from heat, moisture, and direct light. Drugs and Foods to Avoid: Ask your doctor or pharmacist before using any other medicine, including over-the-counter medicines, vitamins, and herbal products. · Do not use this medicine if you have used an MAO inhibitor within the past 14 days. Do not take an MAO inhibitor for at least 7 days after you stop this medicine. · Some medicines can affect how venlafaxine works. Tell your doctor if you are using any of the following:  
¨ Buspirone, cimetidine, fentanyl, ketoconazole, lithium, metoprolol, mirtazapine, Roxann's wort, tramadol, or tryptophan supplements ¨ Amphetamines ¨ Blood thinner (including warfarin) ¨ Diuretic (water pill) ¨ Medicine for migraine headaches ¨ Medicine to lose weight (including phentermine) ¨ NSAID pain or arthritis medicine (including aspirin, celecoxib, diclofenac, ibuprofen, naproxen) ¨ Tricyclic antidepressant · Do not drink alcohol while you are using this medicine. · Tell your doctor if you use anything else that makes you sleepy. Some examples are allergy medicine, narcotic pain medicine, and alcohol. Warnings While Using This Medicine: · Tell your doctor if you are pregnant or breastfeeding, or if you have kidney disease, liver disease, glaucoma, heart disease, high blood pressure, or thyroid problems. Tell your doctor if you have a history of trudi, seizures, heart attack, or stroke. · This medicine can increase thoughts of suicide. Tell your doctor right away if you start to feel depressed and have thoughts about hurting yourself. · This medicine may cause the following problems:  
¨ Serotonin syndrome (when used with certain medicines) ¨ Increased cholesterol levels ¨ Increased blood pressure ¨ Increased risk of bleeding problems ¨ Low sodium levels ¨ Interstitial lung disease and eosinophilic pneumonia · This medicine may make you dizzy or drowsy. Do not drive or do anything that could be dangerous until you know how this medicine affects you. · Do not stop using this medicine suddenly. Your doctor will need to slowly decrease your dose before you stop it completely. · Tell any doctor or dentist who treats you that you are using this medicine. This medicine may affect certain medical test results. · Your doctor will do lab tests at regular visits to check on the effects of this medicine. Keep all appointments. · Keep all medicine out of the reach of children. Never share your medicine with anyone. Possible Side Effects While Using This Medicine:  
Call your doctor right away if you notice any of these side effects: · Allergic reaction: Itching or hives, swelling in your face or hands, swelling or tingling in your mouth or throat, chest tightness, trouble breathing · Anxiety, restlessness, fever, sweating, muscle spasms, nausea, vomiting, diarrhea, seeing or hearing things that are not there · Blistering, peeling, red skin rash · Chest pain, cough, trouble breathing · Confusion, weakness, and muscle twitching · Eye pain, vision changes, seeing halos around lights · Fast or pounding heartbeat · Feeling more excited or energetic than usual 
· Headache, trouble concentrating, memory problems, unsteadiness · Seizures · Unusual behavior, thoughts of hurting yourself or others, trouble sleeping, nervousness, unusual dreams · Unusual bleeding or bruising If you notice these less serious side effects, talk with your doctor: · Dry mouth · Mild nausea, constipation, vomiting, loss of appetite, weight loss · Sexual problems · Sleepiness or unusual drowsiness, dizziness If you notice other side effects that you think are caused by this medicine, tell your doctor. Call your doctor for medical advice about side effects. You may report side effects to FDA at 4-205-ONG-1562 © 2017 [employer] Information is for End User's use only and may not be sold, redistributed or otherwise used for commercial purposes. The above information is an  only. It is not intended as medical advice for individual conditions or treatments. Talk to your doctor, nurse or pharmacist before following any medical regimen to see if it is safe and effective for you. Introducing [facility]! Dear Maninder Fletcher: 
Thank you for requesting a [employer] account. Our records indicate that you already have an active [employer] account. You can access your account anytime at [email or web address]. [employer] Did you know that you can access your hospital and ER discharge instructions at any time in [employer]? You can also review all of your test results from your hospital stay or ER visit. Additional Information If you have questions, please visit the Frequently Asked Questions section of the [employer] website at [email or web address]. [employer]/. Remember, [employer] is NOT to be used for urgent needs. For medical emergencies, dial 911. Now available from your iPhone and Android! Please provide this summary of care documentation to your next provider. Your primary care clinician is listed as Jan Cook. If you have any questions after today's visit, please call [phone].

## 2017-11-16 NOTE — PROGRESS NOTES
Medication Problem (Pt report that she was taking Cymbalta and stopped taking it a month ago because it made her sick and she had a decrease sex drive  want to try another Anxiety medication)        HPI: Mariah Bear is a 23 y.o. female [facility] here with hx of JEREMY and mild depression. Formerly on Cymbalta as prescribed by her psychiatrist.  See scanned images. She reports that 2-3 weeks after starting this medication she had decreased libido, inability to orgasm, dry mouth, nausea. She stopped use of medication 1-2 months ago. She reports she continues to have decreased libido, inability to orgasm. Her anxiety has increased. Having some anxiety over the above issues. She is , reports relationship is good. She does not feel depressed. Denies SI/HI or A/V hallucinations        Past Medical History:   Diagnosis Date    Depression     JEREMY- Generalizes Anxiety Disorder       Current Outpatient Prescriptions   Medication Sig    norethindrone ac-eth estradiol (Bulmaro Spatz 1.5/30, 21,) 1.5-30 mg-mcg tab Take  by mouth.  venlafaxine-SR (EFFEXOR-XR) 37.5 mg capsule Take 1 Cap by mouth daily.  Lisdexamfetamine (VYVANSE) 40 mg capsule Take 1 Cap (40 mg total) by mouth dailyEarliest Fill Date: 5/6/17. Max Daily Amount: 40 mg    cyclobenzaprine (FLEXERIL) 10 mg tablet Take 1 Tab by mouth nightly. No current facility-administered medications for this visit. No Known Allergies    History reviewed. No pertinent surgical history. Family History   Problem Relation Age of Onset    Adopted: Yes       Social History     Social History    Marital status:      Spouse name: N/A    Number of children: N/A    Years of education: N/A     Occupational History    Not on file.      Social History Main Topics    Smoking status: Never Smoker    Smokeless tobacco: Never Used    Alcohol use 2.4 oz/week     4 Shots of liquor per week      Comment: occasional    Drug use: No    Sexual activity: Yes     Partners: Male     Birth control/ protection: Pill     Other Topics Concern    Not on file     Social History Narrative           Visit Vitals    /69 (BP 1 Location: Right arm, BP Patient Position: Sitting)    Pulse 77    Temp 98.6 °F (37 °C) (Oral)    Resp 18    Ht 5' 6\" (1.676 m)    Wt 131 lb (59.4 kg)    LMP 10/26/2017 (Approximate)    SpO2 96%    BMI 21.14 kg/m2       Physical Exam   Constitutional: She appears well-developed and well-nourished. No distress. Neurological: She is alert. Skin: Skin is warm and dry. She is not diaphoretic. Psychiatric: She has a normal mood and affect. Her behavior is normal. Judgment and thought content normal.   Nursing note and vitals reviewed. Assesment:  1. JEREMY (generalized anxiety disorder)  Start effexor daily. Update via [employer] in 2-3 weeks. - venlafaxine-SR (EFFEXOR-XR) 37.5 mg capsule; Take 1 Cap by mouth daily. Dispense: 30 Cap; Refill: 1        I have discussed the diagnosis with the patient and the intended management  The patient has received an after-visit summary and questions were answered concerning future plans. I have discussed medication usage, side effects and warnings with the patient as well. I have reviewed the plan of care with the patient, accepted their input and they are in agreement with the treatment goals. Follow-up Disposition:  Return in about 2 months (around 1/16/2018) for anxiety.       Nadeem Royal MD                .

## 2017-11-16 NOTE — PATIENT INSTRUCTIONS
Please start effexor every morning. Mychart or call me in 2-3 weeks with how things are going    Venlafaxine (By mouth)   Venlafaxine ([id number])  Treats depression, generalized anxiety disorder, panic disorder, and social anxiety disorder. Brand Name(s): Effexor XR   There may be other brand names for this medicine. When This Medicine Should Not Be Used: This medicine is not right for everyone. Do not use it if you had an allergic reaction to venlafaxine or desvenlafaxine succinate. How to Use This Medicine:   Long Acting Capsule, Tablet, Long Acting Tablet  · Take your medicine as directed. Your dose may need to be changed several times to find what works best for you. · It is best to take the extended-release capsule at the same time each day (either in the morning or evening). · It is best to take this medicine with food or milk. · Swallow the extended-release capsule whole. Do not crush, break, or chew it. Do not place the capsule in a liquid. · If you cannot swallow the extended-release capsule, you may open it and pour the medicine into a small amount of soft food such as pudding, yogurt, or applesauce. Stir this mixture well and swallow it without chewing. · This medicine should come with a Medication Guide. Ask your pharmacist for a copy if you do not have one. · Missed dose: Take a dose as soon as you remember. If it is almost time for your next dose, wait until then and take a regular dose. Do not take extra medicine to make up for a missed dose. · Store the medicine in a closed container at room temperature, away from heat, moisture, and direct light. Drugs and Foods to Avoid:   Ask your doctor or pharmacist before using any other medicine, including over-the-counter medicines, vitamins, and herbal products. · Do not use this medicine if you have used an MAO inhibitor within the past 14 days. Do not take an MAO inhibitor for at least 7 days after you stop this medicine.   · Some medicines can affect how venlafaxine works. Tell your doctor if you are using any of the following:   ¨ Buspirone, cimetidine, fentanyl, ketoconazole, lithium, metoprolol, mirtazapine, Roxann's wort, tramadol, or tryptophan supplements  ¨ Amphetamines  ¨ Blood thinner (including warfarin)  ¨ Diuretic (water pill)  ¨ Medicine for migraine headaches  ¨ Medicine to lose weight (including phentermine)  ¨ NSAID pain or arthritis medicine (including aspirin, celecoxib, diclofenac, ibuprofen, naproxen)  ¨ Tricyclic antidepressant  · Do not drink alcohol while you are using this medicine. · Tell your doctor if you use anything else that makes you sleepy. Some examples are allergy medicine, narcotic pain medicine, and alcohol. Warnings While Using This Medicine:   · Tell your doctor if you are pregnant or breastfeeding, or if you have kidney disease, liver disease, glaucoma, heart disease, high blood pressure, or thyroid problems. Tell your doctor if you have a history of trudi, seizures, heart attack, or stroke. · This medicine can increase thoughts of suicide. Tell your doctor right away if you start to feel depressed and have thoughts about hurting yourself. · This medicine may cause the following problems:   ¨ Serotonin syndrome (when used with certain medicines)  ¨ Increased cholesterol levels  ¨ Increased blood pressure  ¨ Increased risk of bleeding problems  ¨ Low sodium levels  ¨ Interstitial lung disease and eosinophilic pneumonia  · This medicine may make you dizzy or drowsy. Do not drive or do anything that could be dangerous until you know how this medicine affects you. · Do not stop using this medicine suddenly. Your doctor will need to slowly decrease your dose before you stop it completely. · Tell any doctor or dentist who treats you that you are using this medicine. This medicine may affect certain medical test results.   · Your doctor will do lab tests at regular visits to check on the effects of this medicine. Keep all appointments. · Keep all medicine out of the reach of children. Never share your medicine with anyone. Possible Side Effects While Using This Medicine:   Call your doctor right away if you notice any of these side effects:  · Allergic reaction: Itching or hives, swelling in your face or hands, swelling or tingling in your mouth or throat, chest tightness, trouble breathing  · Anxiety, restlessness, fever, sweating, muscle spasms, nausea, vomiting, diarrhea, seeing or hearing things that are not there  · Blistering, peeling, red skin rash  · Chest pain, cough, trouble breathing  · Confusion, weakness, and muscle twitching  · Eye pain, vision changes, seeing halos around lights  · Fast or pounding heartbeat  · Feeling more excited or energetic than usual  · Headache, trouble concentrating, memory problems, unsteadiness  · Seizures  · Unusual behavior, thoughts of hurting yourself or others, trouble sleeping, nervousness, unusual dreams  · Unusual bleeding or bruising  If you notice these less serious side effects, talk with your doctor:   · Dry mouth  · Mild nausea, constipation, vomiting, loss of appetite, weight loss  · Sexual problems  · Sleepiness or unusual drowsiness, dizziness  If you notice other side effects that you think are caused by this medicine, tell your doctor. Call your doctor for medical advice about side effects. You may report side effects to FDA at 0-410-FDA-6857  © 2017 [address] Information is for End User's use only and may not be sold, redistributed or otherwise used for commercial purposes. The above information is an  only. It is not intended as medical advice for individual conditions or treatments. Talk to your doctor, nurse or pharmacist before following any medical regimen to see if it is safe and effective for you.

## 2017-12-27 DIAGNOSIS — F41.1 GAD (GENERALIZED ANXIETY DISORDER): ICD-10-CM

## 2017-12-27 RX ORDER — VENLAFAXINE HYDROCHLORIDE 37.5 MG/1
CAPSULE, EXTENDED RELEASE ORAL
Qty: 30 CAP | Refills: 0 | Status: SHIPPED | OUTPATIENT
Start: 2017-12-27 | End: 2018-02-09 | Stop reason: SDUPTHER

## 2018-02-09 ENCOUNTER — OFFICE VISIT (OUTPATIENT)
Dept: FAMILY MEDICINE CLINIC | Age: 23
End: 2018-02-09

## 2018-02-09 VITALS
BODY MASS INDEX: 21.05 KG/M2 | HEART RATE: 82 BPM | RESPIRATION RATE: 20 BRPM | OXYGEN SATURATION: 97 % | HEIGHT: 66 IN | DIASTOLIC BLOOD PRESSURE: 73 MMHG | TEMPERATURE: 97.6 F | SYSTOLIC BLOOD PRESSURE: 112 MMHG | WEIGHT: 131 LBS

## 2018-02-09 DIAGNOSIS — F90.2 ATTENTION DEFICIT HYPERACTIVITY DISORDER (ADHD), COMBINED TYPE: Primary | Chronic | ICD-10-CM

## 2018-02-09 DIAGNOSIS — F41.1 GAD (GENERALIZED ANXIETY DISORDER): ICD-10-CM

## 2018-02-09 RX ORDER — VENLAFAXINE HYDROCHLORIDE 37.5 MG/1
CAPSULE, EXTENDED RELEASE ORAL
Qty: 30 CAP | Refills: 2 | Status: SHIPPED | OUTPATIENT
Start: 2018-02-09

## 2018-02-09 NOTE — MR AVS SNAPSHOT
[address] [facility] [address] [phone] Patient: Mateus Sierra MRN: [id number] :1995 Visit Information Date & Time Provider Department Dept. Phone Encounter #  
 2018  3:00 PM Nicola Molina Miladis [phone] [id number] Follow-up Instructions Return in about 2 months (around 2018) for JEREMY. Upcoming Health Maintenance Date Due Hepatitis A Peds Age 1-18 (1 of 2 - Standard Series) 10/10/1998 DTaP/Tdap/Td series (1 - Tdap) 10/10/2004 HPV AGE 9Y-26Y (1 of 3 - Female 3 Dose Series) 10/10/2008 Influenza Age 5 to Adult 2017 Allergies as of 2018  Review Complete On: 2018 By: Antonio Conner PA-C No Known Allergies Current Immunizations  Never Reviewed No immunizations on file. Not reviewed this visit You Were Diagnosed With   
  
 Codes Comments Attention deficit hyperactivity disorder (ADHD), combined type    -  Primary ICD-10-CM: F90.2 ICD-9-CM: 314.01   
 JEREMY (generalized anxiety disorder)     ICD-10-CM: F41.1 ICD-9-CM: 300.02 Vitals BP Pulse Temp Resp Height(growth percentile) Weight(growth percentile) 112/73 82 97.6 °F (36.4 °C) (Oral) 20 5' 6\" (1.676 m) 131 lb (59.4 kg) LMP SpO2 BMI OB Status Smoking Status 2018 97% 21.14 kg/m2 Having regular periods Never Smoker Vitals History BMI and BSA Data Body Mass Index Body Surface Area  
 21.14 kg/m 2 1.66 m 2 Preferred Pharmacy Pharmacy Name Phone [facility] [phone] [facility] [address] [phone] Your Updated Medication List  
  
   
This list is accurate as of: 18  3:32 PM.  Always use your most recent med list.  
  
  
  
  
 cyclobenzaprine 10 mg tablet Commonly known as:  FLEXERIL Take 1 Tab by mouth nightly. Lisdexamfetamine 40 mg capsule Commonly known as:  VYVANSE Take 1 Cap (40 mg total) by mouth dailyEarliest Fill Date: 2/9/18. Max Daily Amount: 40 mg  
  
 MICROGESTIN 1.5/30 (21) 1.5-30 mg-mcg Tab Generic drug:  norethindrone ac-eth estradiol Take  by mouth. venlafaxine-SR 37.5 mg capsule Commonly known as:  EFFEXOR-XR  
TAKE 1 CAPSULE BY MOUTH DAILY Prescriptions Printed Refills Lisdexamfetamine (VYVANSE) 40 mg capsule 0 Sig: Take 1 Cap (40 mg total) by mouth dailyEarliest Fill Date: 2/9/18. Max Daily Amount: 40 mg  
 Class: Print Route: Oral  
  
Prescriptions Sent to Pharmacy Refills  
 venlafaxine-SR (EFFEXOR-XR) 37.5 mg capsule 2 Sig: TAKE 1 CAPSULE BY MOUTH DAILY Class: Normal  
 Pharmacy: [facility] [address] [place] [address] PKY  [facility] #: [phone] Follow-up Instructions Return in about 2 months (around 4/9/2018) for JEREMY. Patient Instructions [place], [place] Location: 
[address] [facility] [address] [place] [address] Miguel Burrell 229 Phone: [phone] Attention Deficit Hyperactivity Disorder (ADHD) in Adults: Care Instructions Your Care Instructions Attention deficit hyperactivity disorder, or ADHD, is a condition that makes it hard to pay attention. So you may have problems when you try to focus, get organized, and finish tasks. It might make you more active than other people. Or you might do things without thinking first. 
ADHD is very common. It usually starts in early childhood. Many adults don't realize they have it until their children are diagnosed. Then they become aware of their own symptoms. Doctors don't know what causes ADHD. But it often runs in families. ADHD can be treated with medicines, behavior training, and counseling. Treatment can improve your life. Follow-up care is a key part of your treatment and safety. Be sure to make and go to all appointments, and call your doctor if you are having problems. It's also a good idea to know your test results and keep a list of the medicines you take. How can you care for yourself at home? · Learn all you can about ADHD. This will help you and your family understand it better. · Take your medicines exactly as prescribed. Call your doctor if you think you are having a problem with your medicine. You will get more details on the specific medicines your doctor prescribes. · If you miss a dose of your medicine, do not take an extra dose. · If your doctor suggests counseling, find a counselor you like and trust. Talk openly and honestly. Be willing to make some changes. · Find a support group for adults with ADHD. Talking to others with the same problems can help you feel better. It can also give you ideas about how to best cope with the condition. · Get rid of distractions at your work space. Keep your desk clean. Try not to face a window or busy hallway. · Use files, planners, and other tools to keep you organized. · Limit use of alcohol, and do not use illegal drugs. People with ADHD tend to become addicted more easily than others. Tell your doctor if you need help to quit. Counseling, support groups, and sometimes medicines can help you stay free of alcohol or drugs. · Get at least 30 minutes of physical activity on most days of the week. Exercise has been shown to help people cope with ADHD. Walking is a good choice. You also may want to do other activities, such as running, swimming, cycling, or playing tennis or team sports. When should you call for help? Watch closely for changes in your health, and be sure to contact your doctor if: 
? · You feel sad a lot or cry all the time. ? · You have trouble sleeping, or you sleep too much. ? · You find it hard to concentrate, make decisions, or remember things. ? · You change how you normally eat. ? · You feel guilty for no reason. Where can you learn more? Go to http://chapo-lizbet.info/. Enter B196 in the search box to learn more about \"Attention Deficit Hyperactivity Disorder (ADHD) in Adults: Care Instructions. \" Current as of: May 12, 2017 Content Version: 11.4 © 1221-3511 [employer]. Care instructions adapted under license by [employer] (which disclaims liability or warranty for this information). If you have questions about a medical condition or this instruction, always ask your healthcare professional. [place] [address] any warranty or liability for your use of this information. Anxiety Disorder: Care Instructions Your Care Instructions Anxiety is a normal reaction to stress. Difficult situations can cause you to have symptoms such as sweaty palms and a nervous feeling. In an anxiety disorder, the symptoms are far more severe. Constant worry, muscle tension, trouble sleeping, nausea and diarrhea, and other symptoms can make normal daily activities difficult or impossible. These symptoms may occur for no reason, and they can affect your work, school, or social life. Medicines, counseling, and self-care can all help. Follow-up care is a key part of your treatment and safety. Be sure to make and go to all appointments, and call your doctor if you are having problems. It's also a good idea to know your test results and keep a list of the medicines you take. How can you care for yourself at home? · Take medicines exactly as directed. Call your doctor if you think you are having a problem with your medicine. · Go to your counseling sessions and follow-up appointments. · Recognize and accept your anxiety. Then, when you are in a situation that makes you anxious, say to yourself, \"This is not an emergency. I feel uncomfortable, but I am not in danger.  I can keep going even if I feel anxious. \" · Be kind to your body: ¨ Relieve tension with exercise or a massage. ¨ Get enough rest. 
¨ Avoid alcohol, caffeine, nicotine, and illegal drugs. They can increase your anxiety level and cause sleep problems. ¨ Learn and do relaxation techniques. See below for more about these techniques. · Engage your mind. Get out and do something you enjoy. Go to a funny movie, or take a walk or hike. Plan your day. Having too much or too little to do can make you anxious. · Keep a record of your symptoms. Discuss your fears with a good friend or family member, or join a support group for people with similar problems. Talking to others sometimes relieves stress. · Get involved in social groups, or volunteer to help others. Being alone sometimes makes things seem worse than they are. · Get at least 30 minutes of exercise on most days of the week to relieve stress. Walking is a good choice. You also may want to do other activities, such as running, swimming, cycling, or playing tennis or team sports. Relaxation techniques Do relaxation exercises 10 to 20 minutes a day. You can play soothing, relaxing music while you do them, if you wish. · Tell others in your house that you are going to do your relaxation exercises. Ask them not to disturb you. · Find a comfortable place, away from all distractions and noise. · Lie down on your back, or sit with your back straight. · Focus on your breathing. Make it slow and steady. · Breathe in through your nose. Breathe out through either your nose or mouth. · Breathe deeply, filling up the area between your navel and your rib cage. Breathe so that your belly goes up and down. · Do not hold your breath. · Breathe like this for 5 to 10 minutes. Notice the feeling of calmness throughout your whole body. As you continue to breathe slowly and deeply, relax by doing the following for another 5 to 10 minutes: · Tighten and relax each muscle group in your body. You can begin at your toes and work your way up to your head. · Imagine your muscle groups relaxing and becoming heavy. · Empty your mind of all thoughts. · Let yourself relax more and more deeply. · Become aware of the state of calmness that surrounds you. · When your relaxation time is over, you can bring yourself back to alertness by moving your fingers and toes and then your hands and feet and then stretching and moving your entire body. Sometimes people fall asleep during relaxation, but they usually wake up shortly afterward. · Always give yourself time to return to full alertness before you drive a car or do anything that might cause an accident if you are not fully alert. Never play a relaxation tape while you drive a car. When should you call for help? Call 911 anytime you think you may need emergency care. For example, call if: 
? · You feel you cannot stop from hurting yourself or someone else. ? Keep the numbers for these national suicide hotlines: 4-637-751-TALK (5-778-575-705.303.9414) and 0-538-IMYFOZE (0-300.445.8622). If you or someone you know talks about suicide or feeling hopeless, get help right away. ? Watch closely for changes in your health, and be sure to contact your doctor if: 
? · You have anxiety or fear that affects your life. ? · You have symptoms of anxiety that are new or different from those you had before. Where can you learn more? Go to http://chapo-lizbet.info/. Enter P754 in the search box to learn more about \"Anxiety Disorder: Care Instructions. \" Current as of: May 12, 2017 Content Version: 11.4 © 2512-0792 [employer]. Care instructions adapted under license by [employer] (which disclaims liability or warranty for this information).  If you have questions about a medical condition or this instruction, always ask your healthcare professional. [employer] disclaims any warranty or liability for your use of this information. Introducing [facility]! Dear Rabia Roles: 
Thank you for requesting a [facility] account. Our records indicate that you already have an active [facility] account. You can access your account anytime at [email or web address] Did you know that you can access your hospital and ER discharge instructions at any time in [facility]? You can also review all of your test results from your hospital stay or ER visit. Additional Information If you have questions, please visit the Frequently Asked Questions section of the [facility] website at [email or web address] Remember, [facility] is NOT to be used for urgent needs. For medical emergencies, dial 911. Now available from your iPhone and Android! Please provide this summary of care documentation to your next provider. Your primary care clinician is listed as Rebekah Carlson. If you have any questions after today's visit, please call [phone].

## 2018-02-09 NOTE — PROGRESS NOTES
HISTORY OF PRESENT ILLNESS  Whitley Magallanes is a 23 y.o. female. GERMAN Blue is a 23 y.o. female who presents to the office today for med refill. JEREMY- She was started on Effexor at her last visit. She says this has helped a lot, but still having anxiety. She was seeing Psych but did not care for the psychiatrist.   ADHD- She was taking Vyvanse 40mg consistently while in school, but stopped taking the medication 2 semesters ago because she felt that she did not need it. She started classes again this semester and is having a difficult time concentrating again, cannot complete a task, and feels overwhelmed.  shows last Rx for Vyvanse was in March 2017. Chief Complaint   Patient presents with    Depression     follow up        Current Outpatient Prescriptions on File Prior to Visit   Medication Sig Dispense Refill    norethindrone ac-eth estradiol ([facility] 1.5/30, 21,) 1.5-30 mg-mcg tab Take  by mouth.  cyclobenzaprine (FLEXERIL) 10 mg tablet Take 1 Tab by mouth nightly. 30 Tab 3     No current facility-administered medications on file prior to visit. No Known Allergies  Past Medical History:   Diagnosis Date    Depression     JEREMY- Generalizes Anxiety Disorder     History   Smoking Status    Never Smoker   Smokeless Tobacco    Never Used     History   Alcohol Use    2.4 oz/week    4 Shots of liquor per week     Comment: occasional     Family History   Problem Relation Age of Onset    Adopted: Yes     Review of Systems   Constitutional: Negative for malaise/fatigue and weight loss. Cardiovascular: Negative for chest pain. Gastrointestinal: Negative for abdominal pain, nausea and vomiting. Musculoskeletal: Negative for falls. Skin: Negative for rash. Endo/Heme/Allergies: Does not bruise/bleed easily. Psychiatric/Behavioral: Positive for depression. Negative for substance abuse and suicidal ideas. The patient is nervous/anxious.          Depression and anxiety improved on effexor     Visit Vitals    /73    Pulse 82    Temp 97.6 °F (36.4 °C) (Oral)    Resp 20    Ht 5' 6\" (1.676 m)    Wt 131 lb (59.4 kg)    LMP 01/26/2018    SpO2 97%    BMI 21.14 kg/m2     Physical Exam   Constitutional: She is oriented to person, place, and time. She appears well-developed and well-nourished. No distress. Cardiovascular: Normal rate. Pulmonary/Chest: No respiratory distress. Neurological: She is alert and oriented to person, place, and time. Skin: Skin is warm and dry. Psychiatric: She has a normal mood and affect. Her speech is normal and behavior is normal. Thought content normal. She expresses no homicidal and no suicidal ideation. She expresses no suicidal plans and no homicidal plans. Nursing note and vitals reviewed. ASSESSMENT and PLAN    ICD-10-CM ICD-9-CM    1. Attention deficit hyperactivity disorder (ADHD), combined type F90.2 314.01 Lisdexamfetamine (VYVANSE) 40 mg capsule   2. JEREMY (generalized anxiety disorder) F41.1 300.02 venlafaxine-SR (EFFEXOR-XR) 37.5 mg capsule      I have given her information on a psychiatrist in [place]. She will establish care with them and follow up for ADHD and JEREMY. She lives in [facility] and drives a long way to this office. She will likely start going to our [facility] office. Reviewed medication and side effects. Patient agrees with the plan and verbalizes understanding. Follow-up Disposition:  Return in about 2 months (around 4/9/2018) for JEREMY.     Kenia Price PA-C  2/9/2018

## 2018-02-09 NOTE — PATIENT INSTRUCTIONS
[place], [place] Location:  [address]  [address] [employer]  [address]   Phone: [phone]       Attention Deficit Hyperactivity Disorder (ADHD) in Adults: Care Instructions  Your Care Instructions    Attention deficit hyperactivity disorder, or ADHD, is a condition that makes it hard to pay attention. So you may have problems when you try to focus, get organized, and finish tasks. It might make you more active than other people. Or you might do things without thinking first.  ADHD is very common. It usually starts in early childhood. Many adults don't realize they have it until their children are diagnosed. Then they become aware of their own symptoms. Doctors don't know what causes ADHD. But it often runs in families. ADHD can be treated with medicines, behavior training, and counseling. Treatment can improve your life. Follow-up care is a key part of your treatment and safety. Be sure to make and go to all appointments, and call your doctor if you are having problems. It's also a good idea to know your test results and keep a list of the medicines you take. How can you care for yourself at home? · Learn all you can about ADHD. This will help you and your family understand it better. · Take your medicines exactly as prescribed. Call your doctor if you think you are having a problem with your medicine. You will get more details on the specific medicines your doctor prescribes. · If you miss a dose of your medicine, do not take an extra dose. · If your doctor suggests counseling, find a counselor you like and trust. Talk openly and honestly. Be willing to make some changes. · Find a support group for adults with ADHD. Talking to others with the same problems can help you feel better. It can also give you ideas about how to best cope with the condition. · Get rid of distractions at your work space.  Keep your desk clean. Try not to face a window or busy hallway. · Use files, planners, and other tools to keep you organized. · Limit use of alcohol, and do not use illegal drugs. People with ADHD tend to become addicted more easily than others. Tell your doctor if you need help to quit. Counseling, support groups, and sometimes medicines can help you stay free of alcohol or drugs. · Get at least 30 minutes of physical activity on most days of the week. Exercise has been shown to help people cope with ADHD. Walking is a good choice. You also may want to do other activities, such as running, swimming, cycling, or playing tennis or team sports. When should you call for help? Watch closely for changes in your health, and be sure to contact your doctor if:  ? · You feel sad a lot or cry all the time. ? · You have trouble sleeping, or you sleep too much. ? · You find it hard to concentrate, make decisions, or remember things. ? · You change how you normally eat. ? · You feel guilty for no reason. Where can you learn more? Go to http://chapo-lizbet.info/. Enter B196 in the search box to learn more about \"Attention Deficit Hyperactivity Disorder (ADHD) in Adults: Care Instructions. \"  Current as of: May 12, 2017  Content Version: 11.4  © 6122-6202 Healthwise, [employer]. Care instructions adapted under license by [employer] (which disclaims liability or warranty for this information). If you have questions about a medical condition or this instruction, always ask your healthcare professional. [place] [address] any warranty or liability for your use of this information. Anxiety Disorder: Care Instructions  Your Care Instructions    Anxiety is a normal reaction to stress. Difficult situations can cause you to have symptoms such as sweaty palms and a nervous feeling. In an anxiety disorder, the symptoms are far more severe.  Constant worry, muscle tension, trouble sleeping, nausea and diarrhea, and other symptoms can make normal daily activities difficult or impossible. These symptoms may occur for no reason, and they can affect your work, school, or social life. Medicines, counseling, and self-care can all help. Follow-up care is a key part of your treatment and safety. Be sure to make and go to all appointments, and call your doctor if you are having problems. It's also a good idea to know your test results and keep a list of the medicines you take. How can you care for yourself at home? · Take medicines exactly as directed. Call your doctor if you think you are having a problem with your medicine. · Go to your counseling sessions and follow-up appointments. · Recognize and accept your anxiety. Then, when you are in a situation that makes you anxious, say to yourself, \"This is not an emergency. I feel uncomfortable, but I am not in danger. I can keep going even if I feel anxious. \"  · Be kind to your body:  ¨ Relieve tension with exercise or a massage. ¨ Get enough rest.  ¨ Avoid alcohol, caffeine, nicotine, and illegal drugs. They can increase your anxiety level and cause sleep problems. ¨ Learn and do relaxation techniques. See below for more about these techniques. · Engage your mind. Get out and do something you enjoy. Go to a funny movie, or take a walk or hike. Plan your day. Having too much or too little to do can make you anxious. · Keep a record of your symptoms. Discuss your fears with a good friend or family member, or join a support group for people with similar problems. Talking to others sometimes relieves stress. · Get involved in social groups, or volunteer to help others. Being alone sometimes makes things seem worse than they are. · Get at least 30 minutes of exercise on most days of the week to relieve stress. Walking is a good choice.  You also may want to do other activities, such as running, swimming, cycling, or playing tennis or team sports. Relaxation techniques  Do relaxation exercises 10 to 20 minutes a day. You can play soothing, relaxing music while you do them, if you wish. · Tell others in your house that you are going to do your relaxation exercises. Ask them not to disturb you. · Find a comfortable place, away from all distractions and noise. · Lie down on your back, or sit with your back straight. · Focus on your breathing. Make it slow and steady. · Breathe in through your nose. Breathe out through either your nose or mouth. · Breathe deeply, filling up the area between your navel and your rib cage. Breathe so that your belly goes up and down. · Do not hold your breath. · Breathe like this for 5 to 10 minutes. Notice the feeling of calmness throughout your whole body. As you continue to breathe slowly and deeply, relax by doing the following for another 5 to 10 minutes:  · Tighten and relax each muscle group in your body. You can begin at your toes and work your way up to your head. · Imagine your muscle groups relaxing and becoming heavy. · Empty your mind of all thoughts. · Let yourself relax more and more deeply. · Become aware of the state of calmness that surrounds you. · When your relaxation time is over, you can bring yourself back to alertness by moving your fingers and toes and then your hands and feet and then stretching and moving your entire body. Sometimes people fall asleep during relaxation, but they usually wake up shortly afterward. · Always give yourself time to return to full alertness before you drive a car or do anything that might cause an accident if you are not fully alert. Never play a relaxation tape while you drive a car. When should you call for help? Call 911 anytime you think you may need emergency care. For example, call if:  ? · You feel you cannot stop from hurting yourself or someone else. ? Keep the numbers for these national suicide hotlines: 9-047-954-TALK (7-628.560.2194) and 2-840-KPJNNOF (1-897.289.2012). If you or someone you know talks about suicide or feeling hopeless, get help right away. ? Watch closely for changes in your health, and be sure to contact your doctor if:  ? · You have anxiety or fear that affects your life. ? · You have symptoms of anxiety that are new or different from those you had before. Where can you learn more? Go to http://chapo-lizbet.info/. Enter P754 in the search box to learn more about \"Anxiety Disorder: Care Instructions. \"  Current as of: May 12, 2017  Content Version: 11.4  © 5892-6112 Healthwise, [employer]. Care instructions adapted under license by [employer] (which disclaims liability or warranty for this information). If you have questions about a medical condition or this instruction, always ask your healthcare professional. [address] any warranty or liability for your use of this information.

## 2023-12-25 NOTE — PROGRESS NOTES
Nurses Note -- 4 Eyes    12/24/2023  8:35 PM      Skin assessed during: Admit      [] No Altered Skin Integrity Present    []Prevention Measures Documented      [x] Yes- Altered Skin Integrity Present or Discovered   [x] LDA Added if Not in Epic (Describe Wound)   [x] New Altered Skin Integrity was Present on Admit and Documented in LDA   [x] Wound Image Taken    Wound Care Consulted? Yes    Attending Nurse:  Liam Willson RN/Staff Member:  [id number]           PT DAILY TREATMENT NOTE     Patient Name: Stephanie Foley  Date:2017  : 1995  [x]  Patient  Verified  Payor:  / Plan: [facility] [address] AND DEPENDENTS / Product Type: Clifford [address]ez /    In time:9:00  Out time:9:55  Total Treatment Time (min): 55  Total Timed Codes (min): 55  1:1 Treatment Time (min): 55   Visit #: 1 of -8    Treatment Area: Chronic back pain [M54.9, G89.29]    SUBJECTIVE  Pain Level (0-10 scale): 4-5  Any medication changes, allergies to medications, adverse drug reactions, diagnosis change, or new procedure performed?: [x] No    [] Yes (see summary sheet for update)  Subjective functional status/changes:   [] No changes reported  \"I must have slept wrong\"     OBJECTIVE    47 min Therapeutic Exercise:  [x] See flow sheet : increased lifts and chops    Rationale: increase ROM, increase strength and improve coordination to improve the patients ability to improve stability, mechanics with lifting, sitting     8 min Manual Therapy:  Shotgun, L ant innom MET. REIL with PT OP    Rationale: decrease pain, increase ROM, increase tissue extensibility and decrease trigger points to improve mobility for sport, sitting in class          x min Patient Education: [x] Review HEP    [] Progressed/Changed HEP based on:   [] positioning   [] body mechanics   [] transfers   [] heat/ice application        Other Objective/Functional Measures:   REIL with R shift (hips to the left) and pt Overpressure: 2x10,   REIL with PT OP (midline): Decrease pain  OLEGARIO: Poor motor control with both directions. Pain Level (0-10 scale) post treatment: 0    ASSESSMENT/Changes in Function: able to decrease c/o pain to \"soreness but no pain\" after REIL progression. Pt demo's improved ROM with REIL but poor motor control through extension and returning to neutral with REIStanding. She has improved form with birdogs and standing glute sterengthening.  Improved control noted with sidelying abduction but still with weakness. Con't to have difficulty maintaining pelvis neutral .    Patient will continue to benefit from skilled PT services to modify and progress therapeutic interventions, address functional mobility deficits, address ROM deficits, address strength deficits, analyze and address soft tissue restrictions, analyze and cue movement patterns, analyze and modify body mechanics/ergonomics, assess and modify postural abnormalities and instruct in home and community integration to attain remaining goals. []  See Plan of Care  []  See progress note/recertification  []  See Discharge Summary         Progress towards goals / Updated goals:  1. Patient will increase FOTO score to 77/100 for indications of increased functional mobility    2. Patient will demo negative long sit test to indicate improved pelvic stability for return to dead lifting /exercise  (L) anerior innom today, corrected with MET (4/17/17)   3. Pt will be independent and compliant with HEP    4.   Pt will have an increase in L glute med strength to > or = 4+/5 to improve stability of the pelvis in stance, sport, gait  Improved motor control and response to HEP SL abductions (4/17/17)         PLAN  [x]  Upgrade activities as tolerated     []  Continue plan of care  []  Update interventions per flow sheet       []  Discharge due to:_  [x]  Other:_  Please assess for HEP MET innom corrections NV>      Anna Funez, PT 4/17/2017  7:41 AM